# Patient Record
Sex: MALE | Race: WHITE | Employment: FULL TIME | URBAN - METROPOLITAN AREA
[De-identification: names, ages, dates, MRNs, and addresses within clinical notes are randomized per-mention and may not be internally consistent; named-entity substitution may affect disease eponyms.]

---

## 2017-11-14 ENCOUNTER — TELEPHONE (OUTPATIENT)
Dept: GASTROENTEROLOGY | Facility: CLINIC | Age: 37
End: 2017-11-14

## 2017-11-14 DIAGNOSIS — Z86.010 PERSONAL HISTORY OF COLONIC POLYPS: Primary | ICD-10-CM

## 2017-11-15 NOTE — TELEPHONE ENCOUNTER
Last Procedure:  Colonoscopy 01/15/15  Last Diagnosis:  3 colon polys, subtle sessile polyp, in the ascending colon, diverticulosis, internal hemorrhoids  Recalled for (years): 3-5 years  Sedation used previously:  MAC  Last Prep Used (if known):    [employer]

## 2017-11-27 NOTE — TELEPHONE ENCOUNTER
Dr Tosha Stack recall was for 3-5 years    No medications, allergies to Sulfa antibiotics    Please advise orders

## 2017-11-28 NOTE — TELEPHONE ENCOUNTER
Schedule colonoscopy exam at [facility] ([address]    This patient IS appropriate for the [facility] endoscopy center.     MAC anesthesia    Suprep small volume bowel prep if covered by insurance, otherwise Golytely (PEG) 4L hannah

## 2017-11-29 ENCOUNTER — TELEPHONE (OUTPATIENT)
Dept: GASTROENTEROLOGY | Facility: CLINIC | Age: 37
End: 2017-11-29

## 2017-11-29 NOTE — TELEPHONE ENCOUNTER
GI RN's - please place order for pt's Suprep. They have switched to a new pharmacy, [facility] in [place], which I have added in. Thank you.

## 2017-11-29 NOTE — TELEPHONE ENCOUNTER
Scheduled for:  Colonoscopy - 11340  Provider Name:  Dr. Vickie Kelly  Date:  3/6/18  Location:  [facility]  Sedation:  MAC  Time:  9:30 am (pt is aware to arrive at 8:30 am)  Prep:  Suprep, Prep instructions were given to pt's spouse over the phone, she verbalized und

## 2017-12-05 ENCOUNTER — TELEPHONE (OUTPATIENT)
Dept: GASTROENTEROLOGY | Facility: CLINIC | Age: 37
End: 2017-12-05

## 2017-12-05 NOTE — TELEPHONE ENCOUNTER
Spoke with patients wife and explained that due to insurance reasons ([employer]) patient cannot have procedure on Thursday as CB is at [facility]. She verbalized understanding and will keep procedure date from 3-6-18.

## 2018-01-20 ENCOUNTER — OFFICE VISIT (OUTPATIENT)
Dept: INTERNAL MEDICINE CLINIC | Facility: CLINIC | Age: 38
End: 2018-01-20

## 2018-01-20 ENCOUNTER — LAB ENCOUNTER (OUTPATIENT)
Dept: LAB | Age: 38
End: 2018-01-20
Attending: INTERNAL MEDICINE
Payer: COMMERCIAL

## 2018-01-20 ENCOUNTER — TELEPHONE (OUTPATIENT)
Dept: INTERNAL MEDICINE CLINIC | Facility: CLINIC | Age: 38
End: 2018-01-20

## 2018-01-20 VITALS
SYSTOLIC BLOOD PRESSURE: 120 MMHG | WEIGHT: 167 LBS | HEIGHT: 71 IN | BODY MASS INDEX: 23.38 KG/M2 | TEMPERATURE: 98 F | DIASTOLIC BLOOD PRESSURE: 74 MMHG | HEART RATE: 74 BPM

## 2018-01-20 DIAGNOSIS — B35.6 JOCK ITCH: ICD-10-CM

## 2018-01-20 DIAGNOSIS — E78.00 HYPERCHOLESTEROLEMIA: ICD-10-CM

## 2018-01-20 DIAGNOSIS — Z72.0 TOBACCO ABUSE: ICD-10-CM

## 2018-01-20 DIAGNOSIS — K64.4 EXTERNAL HEMORRHOIDS: ICD-10-CM

## 2018-01-20 DIAGNOSIS — Z00.00 ANNUAL PHYSICAL EXAM: ICD-10-CM

## 2018-01-20 DIAGNOSIS — Z00.00 ANNUAL PHYSICAL EXAM: Primary | ICD-10-CM

## 2018-01-20 DIAGNOSIS — L85.3 DRY SKIN DERMATITIS: ICD-10-CM

## 2018-01-20 DIAGNOSIS — Z82.49 FAMILY HISTORY OF CARDIAC ARREST: ICD-10-CM

## 2018-01-20 LAB
ALBUMIN SERPL BCP-MCNC: 4.1 G/DL (ref 3.5–4.8)
ALBUMIN/GLOB SERPL: 1.5 {RATIO} (ref 1–2)
ALP SERPL-CCNC: 76 U/L (ref 32–100)
ALT SERPL-CCNC: 19 U/L (ref 17–63)
ANION GAP SERPL CALC-SCNC: 7 MMOL/L (ref 0–18)
AST SERPL-CCNC: 19 U/L (ref 15–41)
BASOPHILS # BLD: 0.1 K/UL (ref 0–0.2)
BASOPHILS NFR BLD: 1 %
BILIRUB SERPL-MCNC: 0.8 MG/DL (ref 0.3–1.2)
BUN SERPL-MCNC: 12 MG/DL (ref 8–20)
BUN/CREAT SERPL: 14 (ref 10–20)
CALCIUM SERPL-MCNC: 9.1 MG/DL (ref 8.5–10.5)
CHLORIDE SERPL-SCNC: 104 MMOL/L (ref 95–110)
CHOLEST SERPL-MCNC: 253 MG/DL (ref 110–200)
CO2 SERPL-SCNC: 27 MMOL/L (ref 22–32)
CREAT SERPL-MCNC: 0.86 MG/DL (ref 0.5–1.5)
EOSINOPHIL # BLD: 0.4 K/UL (ref 0–0.7)
EOSINOPHIL NFR BLD: 6 %
ERYTHROCYTE [DISTWIDTH] IN BLOOD BY AUTOMATED COUNT: 12.9 % (ref 11–15)
GLOBULIN PLAS-MCNC: 2.8 G/DL (ref 2.5–3.7)
GLUCOSE SERPL-MCNC: 83 MG/DL (ref 70–99)
HCT VFR BLD AUTO: 43.8 % (ref 41–52)
HDLC SERPL-MCNC: 69 MG/DL
HGB BLD-MCNC: 14.5 G/DL (ref 13.5–17.5)
LDLC SERPL CALC-MCNC: 170 MG/DL (ref 0–99)
LYMPHOCYTES # BLD: 2.5 K/UL (ref 1–4)
LYMPHOCYTES NFR BLD: 36 %
MCH RBC QN AUTO: 30 PG (ref 27–32)
MCHC RBC AUTO-ENTMCNC: 33.2 G/DL (ref 32–37)
MCV RBC AUTO: 90.5 FL (ref 80–100)
MONOCYTES # BLD: 0.6 K/UL (ref 0–1)
MONOCYTES NFR BLD: 9 %
NEUTROPHILS # BLD AUTO: 3.3 K/UL (ref 1.8–7.7)
NEUTROPHILS NFR BLD: 47 %
NONHDLC SERPL-MCNC: 184 MG/DL
OSMOLALITY UR CALC.SUM OF ELEC: 285 MOSM/KG (ref 275–295)
PLATELET # BLD AUTO: 268 K/UL (ref 140–400)
PMV BLD AUTO: 8.3 FL (ref 7.4–10.3)
POTASSIUM SERPL-SCNC: 4.1 MMOL/L (ref 3.3–5.1)
PROT SERPL-MCNC: 6.9 G/DL (ref 5.9–8.4)
RBC # BLD AUTO: 4.84 M/UL (ref 4.5–5.9)
SODIUM SERPL-SCNC: 138 MMOL/L (ref 136–144)
TRIGL SERPL-MCNC: 68 MG/DL (ref 1–149)
TSH SERPL-ACNC: 1.52 UIU/ML (ref 0.45–5.33)
WBC # BLD AUTO: 6.9 K/UL (ref 4–11)

## 2018-01-20 PROCEDURE — 84443 ASSAY THYROID STIM HORMONE: CPT

## 2018-01-20 PROCEDURE — 85025 COMPLETE CBC W/AUTO DIFF WBC: CPT

## 2018-01-20 PROCEDURE — 80053 COMPREHEN METABOLIC PANEL: CPT

## 2018-01-20 PROCEDURE — 99406 BEHAV CHNG SMOKING 3-10 MIN: CPT | Performed by: INTERNAL MEDICINE

## 2018-01-20 PROCEDURE — 99395 PREV VISIT EST AGE 18-39: CPT | Performed by: INTERNAL MEDICINE

## 2018-01-20 PROCEDURE — 80061 LIPID PANEL: CPT

## 2018-01-20 PROCEDURE — 99212 OFFICE O/P EST SF 10 MIN: CPT | Performed by: INTERNAL MEDICINE

## 2018-01-20 PROCEDURE — 36415 COLL VENOUS BLD VENIPUNCTURE: CPT

## 2018-01-20 PROCEDURE — 99214 OFFICE O/P EST MOD 30 MIN: CPT | Performed by: INTERNAL MEDICINE

## 2018-01-20 NOTE — PATIENT INSTRUCTIONS
1.  Start taking Aspirin 81mg daily. 2.  Stop smoking. Prevention Guidelines, Men Ages 25 to 44  Screening tests and vaccines are an important part of managing your health. Health counseling is essential, too.  Below are guidelines for these, for men Hepatitis B Men at increased risk for infection – talk with your healthcare provider 3 doses over 6 months; second dose should be given 1 month after the first dose; the third dose should be given at least 2 months after the second dose and at least 4 katerina © 8689-4197 [address]. 1407 [facility] [place], [facility] [place]. All rights reserved. This information is not intended as a substitute for professional medical care. Always follow your healthcare professional's instructions.         Herbie Broussard Do not take this medicine with any of the following medications:  · red yeast rice  · telaprevir  · telithromycin  · voriconazole  This medicine may also interact with the following medications:  · alcohol  · antiviral medicines for HIV or AIDS  · boceprev Tell your doctor or health care professional right away if you get any unexplained muscle pain, tenderness, or weakness, especially if you also have a fever and tiredness.  Your doctor or health care professional may tell you to stop taking this medicine if · Work to create a tasty, healthy eating plan that you can stick to for the rest of your life. Goals for healthy eating  Below are some tips to improve your eating habits:  · Limit saturated fats and trans fats.  Saturated fats raise your levels of [place] · Low-fat or nonfat dairy provides nutrients without a lot of fat. Try low-fat or nonfat milk, cheese, or yogurt. · Healthy fats can be good for you in small amounts. These are unsaturated fats, such as olive oil, nuts, and fish.  Try to have at least 2 se Smoking is one of the hardest habits to break. About half of all people who have ever smoked have been able to quit. Most people who still smoke want to quit. Here are some of the best ways to stop smoking.     Keep trying  Most smokers make many attempts a After reviewing your smoking patterns and past attempts to quit, your doctor may offer a prescription medicine such as bupropion, varenicline, a nicotine inhaler, or nasal spray. Each has advantages and side effects. Your doctor can review these with you.

## 2018-01-20 NOTE — PROGRESS NOTES
Patient ID: Arthur Macedo is a 44year old male. Patient presents with:  Physical: Physical and labs       HISTORY OF PRESENT ILLNESS:   HPI  Patient presents for above.   Here for annual physical exam.  Has known history of high cholesterol from labs Musculoskeletal: Negative. Skin: Positive for rash. Allergic/Immunologic: Negative. Neurological: Negative. Hematological: Negative. Psychiatric/Behavioral: The patient is nervous/anxious.       MEDICAL HISTORY:   No past medical history on fi Abdominal: Soft. Bowel sounds are normal. He exhibits no distension. Hernia confirmed negative in the right inguinal area and confirmed negative in the left inguinal area. Genitourinary: Penis normal. Circumcised.    Musculoskeletal: Normal range of motio Return in about 3 months (around 4/20/2018).     45 minutes spent with patient  30 minutes chart review, counseling, documentation, and coordination of care    Kimber Broussard MD  1/20/2018

## 2018-01-22 ENCOUNTER — TELEPHONE (OUTPATIENT)
Dept: INTERNAL MEDICINE CLINIC | Facility: CLINIC | Age: 38
End: 2018-01-22

## 2018-01-22 ENCOUNTER — TELEPHONE (OUTPATIENT)
Dept: FAMILY MEDICINE CLINIC | Facility: CLINIC | Age: 38
End: 2018-01-22

## 2018-01-23 DIAGNOSIS — E78.00 HYPERCHOLESTEROLEMIA: Primary | ICD-10-CM

## 2018-01-23 RX ORDER — ATORVASTATIN CALCIUM 40 MG/1
40 TABLET, FILM COATED ORAL NIGHTLY
Qty: 90 TABLET | Refills: 0 | Status: SHIPPED | OUTPATIENT
Start: 2018-01-23 | End: 2018-04-17

## 2018-01-23 NOTE — TELEPHONE ENCOUNTER
Pt's wife is calling to f/u on results. Pt's wife HIPPA is actually  (). Pt's wife is requesting them to be uploaded on [address] [place] [address] [address].

## 2018-01-24 NOTE — TELEPHONE ENCOUNTER
Viewed by Luisa Breen on 1/23/2018  9:26 PM   Written by Narmata Chairez MD on 1/23/2018  8:27 PM   Dear Aaliyah Marr,     Your labs are attached. Pat Alvarez have tried contacting you twice regarding your labs.  As you can see your cholesterol panel is significantly

## 2018-01-24 NOTE — TELEPHONE ENCOUNTER
Patient called to confirm if medication was sent to the pharmacy. Advised patient on Dr. Birgit Paul information and prescription, name, dose, frequency. Patient verbalized understanding.  He asked if Lipitor could be taken with baby aspirin; advised that Lipit

## 2018-01-24 NOTE — TELEPHONE ENCOUNTER
Pt's wife called in asking if Dr. Alexey Da Silva can give him another call to further discuss his lab results. Pt's wife states the best # for the pt is [phone].

## 2018-02-24 ENCOUNTER — OFFICE VISIT (OUTPATIENT)
Dept: DERMATOLOGY CLINIC | Facility: CLINIC | Age: 38
End: 2018-02-24

## 2018-02-24 DIAGNOSIS — D23.5 BENIGN NEOPLASM OF SKIN OF TRUNK, EXCEPT SCROTUM: ICD-10-CM

## 2018-02-24 DIAGNOSIS — D23.4 BENIGN NEOPLASM OF SCALP AND SKIN OF NECK: ICD-10-CM

## 2018-02-24 DIAGNOSIS — L81.2 EPHELIDES: ICD-10-CM

## 2018-02-24 DIAGNOSIS — D23.60 BENIGN NEOPLASM OF SKIN OF UPPER LIMB, INCLUDING SHOULDER, UNSPECIFIED LATERALITY: ICD-10-CM

## 2018-02-24 DIAGNOSIS — L82.1 SEBORRHEIC KERATOSES: ICD-10-CM

## 2018-02-24 DIAGNOSIS — D23.70 BENIGN NEOPLASM OF SKIN OF LOWER LIMB, INCLUDING HIP, UNSPECIFIED LATERALITY: ICD-10-CM

## 2018-02-24 DIAGNOSIS — L30.9 DERMATITIS: Primary | ICD-10-CM

## 2018-02-24 DIAGNOSIS — D23.30 BENIGN NEOPLASM OF SKIN OF FACE: ICD-10-CM

## 2018-02-24 PROCEDURE — 99203 OFFICE O/P NEW LOW 30 MIN: CPT | Performed by: DERMATOLOGY

## 2018-02-24 PROCEDURE — 99212 OFFICE O/P EST SF 10 MIN: CPT | Performed by: DERMATOLOGY

## 2018-02-24 RX ORDER — TRIAMCINOLONE ACETONIDE 5 MG/G
OINTMENT TOPICAL
Qty: 60 G | Refills: 3 | Status: SHIPPED | OUTPATIENT
Start: 2018-02-24 | End: 2019-04-20

## 2018-03-02 ENCOUNTER — TELEPHONE (OUTPATIENT)
Dept: GASTROENTEROLOGY | Facility: CLINIC | Age: 38
End: 2018-03-02

## 2018-03-02 NOTE — TELEPHONE ENCOUNTER
Spoke to pts wife Gurmeet Fay (Rose oliver), States pt takes meds at night. Informed ok to take medications at usual time after procedure. Verbalized understanding and had no further questions at this time.

## 2018-03-02 NOTE — TELEPHONE ENCOUNTER
Pt wife called with questions about medication. States pt is currently taking     Baby Aspirin   ATORVASTATIN   Would like to know if its ok to continue taking before procedure on 03/06/2018.  Please call thank you

## 2018-03-05 NOTE — PROGRESS NOTES
Tate Dozier is a 44year old male.   HPI:     CC:  Patient presents with:  Dryness: Dry skin in ears x 3 years  Rash Skin Problem (integumentary): Patient complains of jock itch that never clears up and is intermittent ongoing for 3 years  Moles: [facility] Topics   Smoking status: Current Every Day Smoker  1.00 Packs/day  For 10.00 Years     Types: Cigarettes, Cigars    Smokeless tobacco: Current User    Comment: <1 per day and Vape    Alcohol use Yes    Comment: 2 beers occasionally.   Last drink - last nigh axillae,  abdomen, arms, legs, palms. Multiple light to medium brown, well marginated, uniformly pigmented, macules and papules 6 mm and less scattered on exam. pigmented lesions examined with dermoscopy benign-appearing patterns.      Leonela Palmer they are doing over the next several weeks. Await clinical response to above therapies. 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 Triamcinolone. Call if not improving little change previously with topical antifungals.   May continue

## 2018-03-06 ENCOUNTER — ANESTHESIA EVENT (OUTPATIENT)
Dept: ENDOSCOPY | Facility: HOSPITAL | Age: 38
End: 2018-03-06
Payer: COMMERCIAL

## 2018-03-06 ENCOUNTER — HOSPITAL ENCOUNTER (OUTPATIENT)
Facility: HOSPITAL | Age: 38
Setting detail: HOSPITAL OUTPATIENT SURGERY
Discharge: HOME | End: 2018-03-06
Attending: INTERNAL MEDICINE | Admitting: INTERNAL MEDICINE
Payer: COMMERCIAL

## 2018-03-06 ENCOUNTER — ANESTHESIA (OUTPATIENT)
Dept: ENDOSCOPY | Facility: HOSPITAL | Age: 38
End: 2018-03-06
Payer: COMMERCIAL

## 2018-03-06 ENCOUNTER — SURGERY (OUTPATIENT)
Age: 38
End: 2018-03-06

## 2018-03-06 DIAGNOSIS — Z86.010 PERSONAL HISTORY OF COLONIC POLYPS: Primary | ICD-10-CM

## 2018-03-06 PROCEDURE — 0DJD8ZZ INSPECTION OF LOWER INTESTINAL TRACT, VIA NATURAL OR ARTIFICIAL OPENING ENDOSCOPIC: ICD-10-PCS | Performed by: INTERNAL MEDICINE

## 2018-03-06 PROCEDURE — 45378 DIAGNOSTIC COLONOSCOPY: CPT | Performed by: INTERNAL MEDICINE

## 2018-03-06 RX ORDER — NALOXONE HYDROCHLORIDE 0.4 MG/ML
80 INJECTION, SOLUTION INTRAMUSCULAR; INTRAVENOUS; SUBCUTANEOUS AS NEEDED
Status: DISCONTINUED | OUTPATIENT
Start: 2018-03-06 | End: 2018-03-06

## 2018-03-06 RX ORDER — LIDOCAINE HYDROCHLORIDE 10 MG/ML
INJECTION, SOLUTION EPIDURAL; INFILTRATION; INTRACAUDAL; PERINEURAL AS NEEDED
Status: DISCONTINUED | OUTPATIENT
Start: 2018-03-06 | End: 2018-03-06 | Stop reason: SURG

## 2018-03-06 RX ORDER — SODIUM CHLORIDE, SODIUM LACTATE, POTASSIUM CHLORIDE, CALCIUM CHLORIDE 600; 310; 30; 20 MG/100ML; MG/100ML; MG/100ML; MG/100ML
INJECTION, SOLUTION INTRAVENOUS CONTINUOUS
Status: DISCONTINUED | OUTPATIENT
Start: 2018-03-06 | End: 2018-03-06

## 2018-03-06 RX ADMIN — SODIUM CHLORIDE, SODIUM LACTATE, POTASSIUM CHLORIDE, CALCIUM CHLORIDE: 600; 310; 30; 20 INJECTION, SOLUTION INTRAVENOUS at 10:52:00

## 2018-03-06 RX ADMIN — SODIUM CHLORIDE, SODIUM LACTATE, POTASSIUM CHLORIDE, CALCIUM CHLORIDE: 600; 310; 30; 20 INJECTION, SOLUTION INTRAVENOUS at 10:16:00

## 2018-03-06 RX ADMIN — LIDOCAINE HYDROCHLORIDE 50 MG: 10 INJECTION, SOLUTION EPIDURAL; INFILTRATION; INTRACAUDAL; PERINEURAL at 10:16:00

## 2018-03-06 NOTE — ANESTHESIA POSTPROCEDURE EVALUATION
Patient: Luz Carlos    Procedure Summary     Date:  03/06/18 Room / Location:  [facility] ENDOSCOPY 05 / [facility] ENDOSCOPY    Anesthesia Start:  7418 Anesthesia Stop:      Procedure:  COLONOSCOPY (N/A ) Diagnosis:       Personal history of colonic polyps

## 2018-03-06 NOTE — ANESTHESIA PREPROCEDURE EVALUATION
Anesthesia PreOp Note    HPI:     Tyra Murdock is a 44year old male who presents for preoperative consultation requested by: Glenn Cruz MD    Date of Surgery: 3/6/2018    Procedure(s):  COLONOSCOPY  Indication: Personal history of colo Types: Cigarettes, Cigars    Smokeless tobacco: Current User    Comment: <1 per day and Vape    Alcohol use Yes    Comment: 2 beers occasionally.   Last drink - last night    Drug use: No    Sexual activity: Not on file     Other Topics Concern    Danna nature of the anesthetic plan, benefits, risks, major complications, and any alternative forms of anesthetic management. All of the patient's questions were answered to the best of my ability. The patient desires the anesthetic management as planned.   LA

## 2018-03-08 VITALS
BODY MASS INDEX: 22.9 KG/M2 | DIASTOLIC BLOOD PRESSURE: 84 MMHG | RESPIRATION RATE: 18 BRPM | HEIGHT: 70 IN | OXYGEN SATURATION: 98 % | WEIGHT: 160 LBS | HEART RATE: 84 BPM | SYSTOLIC BLOOD PRESSURE: 114 MMHG

## 2018-04-07 ENCOUNTER — OFFICE VISIT (OUTPATIENT)
Dept: INTERNAL MEDICINE CLINIC | Facility: CLINIC | Age: 38
End: 2018-04-07

## 2018-04-07 ENCOUNTER — APPOINTMENT (OUTPATIENT)
Dept: LAB | Age: 38
End: 2018-04-07
Attending: INTERNAL MEDICINE
Payer: COMMERCIAL

## 2018-04-07 VITALS
HEIGHT: 71 IN | HEART RATE: 84 BPM | SYSTOLIC BLOOD PRESSURE: 114 MMHG | WEIGHT: 166 LBS | BODY MASS INDEX: 23.24 KG/M2 | TEMPERATURE: 98 F | DIASTOLIC BLOOD PRESSURE: 65 MMHG

## 2018-04-07 DIAGNOSIS — E78.00 HYPERCHOLESTEROLEMIA: ICD-10-CM

## 2018-04-07 DIAGNOSIS — Z82.49 FAMILY HISTORY OF CARDIAC ARREST: ICD-10-CM

## 2018-04-07 DIAGNOSIS — E78.00 HYPERCHOLESTEROLEMIA: Primary | ICD-10-CM

## 2018-04-07 DIAGNOSIS — F17.210 CIGARETTE NICOTINE DEPENDENCE WITHOUT COMPLICATION: ICD-10-CM

## 2018-04-07 PROCEDURE — 80061 LIPID PANEL: CPT

## 2018-04-07 PROCEDURE — 80053 COMPREHEN METABOLIC PANEL: CPT

## 2018-04-07 PROCEDURE — 99214 OFFICE O/P EST MOD 30 MIN: CPT | Performed by: INTERNAL MEDICINE

## 2018-04-07 PROCEDURE — 99212 OFFICE O/P EST SF 10 MIN: CPT | Performed by: INTERNAL MEDICINE

## 2018-04-07 PROCEDURE — 99406 BEHAV CHNG SMOKING 3-10 MIN: CPT | Performed by: INTERNAL MEDICINE

## 2018-04-07 PROCEDURE — 36415 COLL VENOUS BLD VENIPUNCTURE: CPT

## 2018-04-07 RX ORDER — ATORVASTATIN CALCIUM 40 MG/1
40 TABLET, FILM COATED ORAL NIGHTLY
Qty: 90 TABLET | Refills: 0 | Status: CANCELLED | OUTPATIENT
Start: 2018-04-07

## 2018-04-07 NOTE — PROGRESS NOTES
Patient ID: Carlos Centeno is a 44year old male. Patient presents with: Follow - Up: Cholesterol       HISTORY OF PRESENT ILLNESS:   HPI  Patient presents for above. Here for follow-up of elevated cholesterol and other issues.   Patient here with hi 3  •  Bacitracin-Polymyx-Lalito-HC 1 % Ophthalmic Ointment, Use bid as dir to psoriasis around eyes, Disp: 15 g, Rfl: 2    Allergies:  Sulfa Antibiotics           Comment:Other reaction(s): rashes      Social History  Social History   Marital status:  father is on. · Diet and exercise discussed. · Continue to keep food diary. 2. Family history of cardiac arrest  · Reiterated importance of needing to take care of his medical issues given his early cardiac disease in the family.   · Needs to stop smok

## 2018-04-07 NOTE — PATIENT INSTRUCTIONS
1.  Follow-up in 3 months. 2.  Stop smoking E cigarettes and tobacco cigarettes. Low-Cholesterol Diet  Your body needs cholesterol to build new cells and create certain hormones.  There are 2 kinds of cholesterol in your blood:     · HDL (“good”) chol High blood cholesterol is usually due to a diet high in saturated fat, along with not being physically active. In some cases, genetics plays a role in causing high cholesterol.  The tips below will help you create healthy eating habits that will help lower © 6374-8183 [address]. 1407 [facility] [place], [address] [place]. All rights reserved. This information is not intended as a substitute for professional medical care. Always follow your healthcare professional's instructions.   How to Quit S Nicotine replacement therapy may make quitting easier. Certain aids, such as the nicotine patch, gum, and lozenges, are available without a prescription. It is best to use these under a doctor’s care, though.  The skin patch provides a steady supply of jamaal © 1718-1395 [address]. 1407 [facility] [place], [facility]2 [place]. All rights reserved. This information is not intended as a substitute for professional medical care. Always follow your healthcare professional's instructions.

## 2018-04-09 ENCOUNTER — PATIENT MESSAGE (OUTPATIENT)
Dept: INTERNAL MEDICINE CLINIC | Facility: CLINIC | Age: 38
End: 2018-04-09

## 2018-04-10 NOTE — TELEPHONE ENCOUNTER
From: Shantanu Hart  To: Tim Mercado MD  Sent: 4/9/2018 6:51 PM CDT  Subject: Visit Follow-up Question    It is great to see such a significant decrease in all the levels, cholestorol, ldl, etc., but how come they decreased so much so quickly?  I thoug

## 2018-04-16 ENCOUNTER — TELEPHONE (OUTPATIENT)
Dept: INTERNAL MEDICINE CLINIC | Facility: CLINIC | Age: 38
End: 2018-04-16

## 2018-04-16 DIAGNOSIS — E78.00 HYPERCHOLESTEROLEMIA: ICD-10-CM

## 2018-04-16 NOTE — TELEPHONE ENCOUNTER
Patient requesting refill for     atorvastatin 40 MG Oral Tab Take 1 tablet (40 mg total) by mouth nightly. Disp: 90 tablet Rfl: 0     Patient is almost out of medication.      Please advise

## 2018-04-17 RX ORDER — ATORVASTATIN CALCIUM 40 MG/1
40 TABLET, FILM COATED ORAL NIGHTLY
Qty: 90 TABLET | Refills: 0 | Status: SHIPPED | OUTPATIENT
Start: 2018-04-17 | End: 2018-07-17

## 2018-04-17 NOTE — TELEPHONE ENCOUNTER
Requesting Atorvastatin refill, notified pt's spouse.     Prescription refilled per IM/FM refill protocol    Cholesterol Medications  Protocol Criteria:  · Appointment scheduled in the past 12 months or in the next 3 months  · ALT & LDL on file in the past

## 2018-07-17 DIAGNOSIS — E78.00 HYPERCHOLESTEROLEMIA: ICD-10-CM

## 2018-07-17 RX ORDER — ATORVASTATIN CALCIUM 40 MG/1
TABLET, FILM COATED ORAL
Qty: 90 TABLET | Refills: 0 | Status: SHIPPED | OUTPATIENT
Start: 2018-07-17 | End: 2018-10-14

## 2018-07-25 ENCOUNTER — OFFICE VISIT (OUTPATIENT)
Dept: INTERNAL MEDICINE CLINIC | Facility: CLINIC | Age: 38
End: 2018-07-25
Payer: COMMERCIAL

## 2018-07-25 VITALS
DIASTOLIC BLOOD PRESSURE: 77 MMHG | HEART RATE: 91 BPM | BODY MASS INDEX: 22.96 KG/M2 | HEIGHT: 71 IN | WEIGHT: 164 LBS | SYSTOLIC BLOOD PRESSURE: 128 MMHG

## 2018-07-25 DIAGNOSIS — F17.210 CIGARETTE NICOTINE DEPENDENCE WITHOUT COMPLICATION: ICD-10-CM

## 2018-07-25 DIAGNOSIS — E78.00 HYPERCHOLESTEROLEMIA: Primary | ICD-10-CM

## 2018-07-25 PROCEDURE — 99213 OFFICE O/P EST LOW 20 MIN: CPT | Performed by: INTERNAL MEDICINE

## 2018-07-25 PROCEDURE — 99212 OFFICE O/P EST SF 10 MIN: CPT | Performed by: INTERNAL MEDICINE

## 2018-07-25 NOTE — PATIENT INSTRUCTIONS
How to Quit Smoking  Smoking is one of the hardest habits to break. About half of all people who have ever smoked have been able to quit. Most people who still smoke want to quit. Here are some of the best ways to stop smoking.     Keep trying  Most smoke After reviewing your smoking patterns and past attempts to quit, your doctor may offer a prescription medicine such as bupropion, varenicline, a nicotine inhaler, or nasal spray. Each has advantages and side effects. Your doctor can review these with you. · LDL (“bad”) cholesterol. This stays in your body and sticks to artery walls. Over time it may block blood flow to the heart and brain. This can cause a heart attack or stroke.   The cholesterol in your blood comes from 2 sources: cholesterol in food that · Learn to read nutrition labels and select appropriate portion sizes. · When cooking, use plant-based unsaturated vegetable oils (sunflower, corn, soybean, canola, peanut, and olive oils).   · Avoid saturated fats found in animal products such as meat, da

## 2018-07-25 NOTE — PROGRESS NOTES
Patient ID: Kelly Copeland is a 43year old male. Patient presents with:  Hyperlipidemia: follow up       HISTORY OF PRESENT ILLNESS:   HPI  Patient presents for above. Here for follow-up multiple issues. History of high cholesterol.   Taking low-dos Packs/day  For 10.00 Years     Types: Cigarettes, Cigars    Smokeless tobacco: Current User    Comment: <1 per day and Vape    Alcohol use Yes    Comment: 2 beers occasionally.   Last drink - last night    Drug use: No    Sexual activity: Not on file     Ot

## 2018-10-14 DIAGNOSIS — E78.00 HYPERCHOLESTEROLEMIA: ICD-10-CM

## 2018-10-15 RX ORDER — ATORVASTATIN CALCIUM 40 MG/1
TABLET, FILM COATED ORAL
Qty: 90 TABLET | Refills: 0 | Status: SHIPPED | OUTPATIENT
Start: 2018-10-15 | End: 2019-01-13

## 2018-10-15 NOTE — TELEPHONE ENCOUNTER
Cholesterol Medications  Protocol Criteria:  · Appointment scheduled in the past 12 months or in the next 3 months  · ALT & LDL on file in the past 12 months  · ALT result < 80  · LDL result <130   Recent Outpatient Visits            2 months ago Hyperchol

## 2018-10-20 ENCOUNTER — IMMUNIZATION (OUTPATIENT)
Dept: PEDIATRICS CLINIC | Facility: CLINIC | Age: 38
End: 2018-10-20
Payer: COMMERCIAL

## 2018-10-20 DIAGNOSIS — Z23 NEED FOR VACCINATION: ICD-10-CM

## 2018-10-20 PROCEDURE — 90471 IMMUNIZATION ADMIN: CPT | Performed by: PEDIATRICS

## 2018-10-20 PROCEDURE — 90686 IIV4 VACC NO PRSV 0.5 ML IM: CPT | Performed by: PEDIATRICS

## 2018-11-13 ENCOUNTER — OFFICE VISIT (OUTPATIENT)
Dept: INTERNAL MEDICINE CLINIC | Facility: CLINIC | Age: 38
End: 2018-11-13
Payer: COMMERCIAL

## 2018-11-13 VITALS
BODY MASS INDEX: 23.24 KG/M2 | WEIGHT: 166 LBS | HEIGHT: 71 IN | TEMPERATURE: 98 F | DIASTOLIC BLOOD PRESSURE: 70 MMHG | HEART RATE: 102 BPM | SYSTOLIC BLOOD PRESSURE: 115 MMHG

## 2018-11-13 DIAGNOSIS — J01.10 ACUTE NON-RECURRENT FRONTAL SINUSITIS: Primary | ICD-10-CM

## 2018-11-13 PROCEDURE — 99213 OFFICE O/P EST LOW 20 MIN: CPT | Performed by: INTERNAL MEDICINE

## 2018-11-13 PROCEDURE — 99212 OFFICE O/P EST SF 10 MIN: CPT | Performed by: INTERNAL MEDICINE

## 2018-11-13 RX ORDER — AMOXICILLIN AND CLAVULANATE POTASSIUM 875; 125 MG/1; MG/1
1 TABLET, FILM COATED ORAL 2 TIMES DAILY
Qty: 20 TABLET | Refills: 0 | Status: SHIPPED | OUTPATIENT
Start: 2018-11-13 | End: 2018-11-23

## 2018-11-14 NOTE — PROGRESS NOTES
Patient ID: Bradly Cosby is a 43year old male. Patient presents with:  Sinus Problem: Possible Sinus infection, nasal congestion, head pressure. HISTORY OF PRESENT ILLNESS:   HPI  Patient presents for above.   Here with sinus congestion for t Allergies:  Sulfa Antibiotics           Comment:Other reaction(s): rashes    Social History    Socioeconomic History      Marital status:       Spouse name: Not on file      Number of children: Not on file      Years of education: Not on file Exam   Constitutional: He appears well-developed and well-nourished. HENT:   Head: Normocephalic and atraumatic. Nose: Mucosal edema, rhinorrhea and sinus tenderness present. Right sinus exhibits frontal sinus tenderness.  Right sinus exhibits no maxill

## 2019-01-13 DIAGNOSIS — E78.00 HYPERCHOLESTEROLEMIA: ICD-10-CM

## 2019-01-13 RX ORDER — ATORVASTATIN CALCIUM 40 MG/1
TABLET, FILM COATED ORAL
Qty: 90 TABLET | Refills: 0 | Status: SHIPPED | OUTPATIENT
Start: 2019-01-13 | End: 2019-04-08

## 2019-04-08 DIAGNOSIS — E78.00 HYPERCHOLESTEROLEMIA: ICD-10-CM

## 2019-04-09 RX ORDER — ATORVASTATIN CALCIUM 40 MG/1
TABLET, FILM COATED ORAL
Qty: 90 TABLET | Refills: 0 | Status: SHIPPED | OUTPATIENT
Start: 2019-04-09 | End: 2019-07-10

## 2019-04-09 NOTE — TELEPHONE ENCOUNTER
Please review; protocol failed.     Requested Prescriptions     Pending Prescriptions Disp Refills   • ATORVASTATIN 40 MG Oral Tab [Pharmacy Med Name: ATORVASTATIN 40 MG TABLET] 90 tablet 0     Sig: TAKE 1 TABLET BY MOUTH EVERY DAY AT NIGHT         Recent V

## 2019-04-09 NOTE — TELEPHONE ENCOUNTER
CSS please contact pt to schedule f/u appt and notify of blood work needed as well, thank you.  Please reply to pool: TAHIRA Delgado

## 2019-04-20 ENCOUNTER — OFFICE VISIT (OUTPATIENT)
Dept: INTERNAL MEDICINE CLINIC | Facility: CLINIC | Age: 39
End: 2019-04-20
Payer: COMMERCIAL

## 2019-04-20 ENCOUNTER — LAB ENCOUNTER (OUTPATIENT)
Dept: LAB | Age: 39
End: 2019-04-20
Attending: INTERNAL MEDICINE
Payer: COMMERCIAL

## 2019-04-20 VITALS
SYSTOLIC BLOOD PRESSURE: 119 MMHG | DIASTOLIC BLOOD PRESSURE: 79 MMHG | BODY MASS INDEX: 24.36 KG/M2 | HEART RATE: 80 BPM | HEIGHT: 71 IN | RESPIRATION RATE: 17 BRPM | WEIGHT: 174 LBS

## 2019-04-20 DIAGNOSIS — Z00.00 ANNUAL PHYSICAL EXAM: Primary | ICD-10-CM

## 2019-04-20 DIAGNOSIS — Z30.09 ENCOUNTER FOR VASECTOMY COUNSELING: ICD-10-CM

## 2019-04-20 DIAGNOSIS — E78.00 HYPERCHOLESTEROLEMIA: ICD-10-CM

## 2019-04-20 DIAGNOSIS — Z00.00 ANNUAL PHYSICAL EXAM: ICD-10-CM

## 2019-04-20 DIAGNOSIS — F17.210 CIGARETTE NICOTINE DEPENDENCE WITHOUT COMPLICATION: ICD-10-CM

## 2019-04-20 DIAGNOSIS — Z82.49 FAMILY HISTORY OF CARDIAC ARREST: ICD-10-CM

## 2019-04-20 PROCEDURE — 80061 LIPID PANEL: CPT

## 2019-04-20 PROCEDURE — 80053 COMPREHEN METABOLIC PANEL: CPT

## 2019-04-20 PROCEDURE — 84443 ASSAY THYROID STIM HORMONE: CPT

## 2019-04-20 PROCEDURE — 85025 COMPLETE CBC W/AUTO DIFF WBC: CPT

## 2019-04-20 PROCEDURE — 99395 PREV VISIT EST AGE 18-39: CPT | Performed by: INTERNAL MEDICINE

## 2019-04-20 PROCEDURE — 36415 COLL VENOUS BLD VENIPUNCTURE: CPT

## 2019-04-20 RX ORDER — TRIAMCINOLONE ACETONIDE 5 MG/G
OINTMENT TOPICAL
Qty: 60 G | Refills: 3 | Status: SHIPPED | OUTPATIENT
Start: 2019-04-20 | End: 2020-11-10

## 2019-04-20 NOTE — PROGRESS NOTES
Patient ID: Cristal Guzman is a 43year old male. Patient presents with:  Physical       HISTORY OF PRESENT ILLNESS:   HPI  Patient presents for above. Here for annual physical and discuss multiple medical problems.     History of high cholesterol med History    Socioeconomic History      Marital status:       Spouse name: Not on file      Number of children: Not on file      Years of education: Not on file      Highest education level: Not on file    Occupational History      Not on file    Soci Asked        Bike Helmet: Not Asked        Seat Belt: Not Asked        Self-Exams: Not Asked        Grew up on a farm: No        History of tanning: No        Outdoor occupation: Yes        Pt has a pacemaker: No        Pt has a defibrillator: No        Re Aretha Mukherjee MD  4/20/2019

## 2019-04-20 NOTE — PATIENT INSTRUCTIONS
Controlling Your Cholesterol  Cholesterol is a waxy substance. It travels in your blood through the blood vessels. When you have high cholesterol, it can build up along the walls of the blood vessels.  This makes the vessels narrower and decreases blood f · Choose an activity you enjoy. Walking, swimming, and riding a bike are some good ways to be active. · Start at a level where you feel comfortable. Increase your time and pace a little each week.   · Work up to 30 to 40 minutes of moderate to high intensi Most former smokers quit cold turkey (all at once). Trying to cut back gradually doesn't seem to work as well, perhaps because it continues the smoking habit. Also, it is possible to inhale more while smoking fewer cigarettes.  This results in the same amou · 20 minutes: Blood pressure and pulse return to normal.  · 8 hours: Oxygen levels return to normal.  · 2 days: Ability to smell and taste begin to improve as damaged nerves regrow. · 2 to 3 weeks: Circulation and lung function improve.   · 1 to 9 months:

## 2019-05-02 ENCOUNTER — TELEPHONE (OUTPATIENT)
Dept: INTERNAL MEDICINE CLINIC | Facility: CLINIC | Age: 39
End: 2019-05-02

## 2019-05-02 DIAGNOSIS — Z11.59 MEASLES SCREENING: Primary | ICD-10-CM

## 2019-05-03 NOTE — TELEPHONE ENCOUNTER
Reviewed doctor's orders with pt's spouse, Carline Allen (BETTY in place). Carline Allen agreed with plan of care and had no further questions at this time.

## 2019-05-04 ENCOUNTER — APPOINTMENT (OUTPATIENT)
Dept: LAB | Age: 39
End: 2019-05-04
Attending: INTERNAL MEDICINE
Payer: COMMERCIAL

## 2019-05-04 DIAGNOSIS — Z11.59 MEASLES SCREENING: ICD-10-CM

## 2019-05-04 PROCEDURE — 86765 RUBEOLA ANTIBODY: CPT

## 2019-05-04 PROCEDURE — 36415 COLL VENOUS BLD VENIPUNCTURE: CPT

## 2019-07-10 DIAGNOSIS — E78.00 HYPERCHOLESTEROLEMIA: ICD-10-CM

## 2019-07-10 RX ORDER — ATORVASTATIN CALCIUM 40 MG/1
TABLET, FILM COATED ORAL
Qty: 90 TABLET | Refills: 1 | Status: SHIPPED | OUTPATIENT
Start: 2019-07-10 | End: 2020-01-26

## 2019-07-10 NOTE — TELEPHONE ENCOUNTER
Refill passed per [facility] protocol.   Cholesterol Medications  Protocol Criteria:  · Appointment scheduled in the past 12 months or in the next 3 months  · ALT & LDL on file in the past 12 months  · ALT result < 80  · LDL result <130   Recent Outpat

## 2020-01-25 DIAGNOSIS — E78.00 HYPERCHOLESTEROLEMIA: ICD-10-CM

## 2020-01-26 RX ORDER — ATORVASTATIN CALCIUM 40 MG/1
TABLET, FILM COATED ORAL
Qty: 90 TABLET | Refills: 1 | Status: SHIPPED | OUTPATIENT
Start: 2020-01-26 | End: 2020-07-29

## 2020-01-26 NOTE — TELEPHONE ENCOUNTER
Refill passed per [facility] protocol.   Cholesterol Medications  Protocol Criteria:  · Appointment scheduled in the past 12 months or in the next 3 months  · ALT & LDL on file in the past 12 months  · ALT result < 80  · LDL result <130   Recent Outpat

## 2020-02-04 ENCOUNTER — HOSPITAL ENCOUNTER (OUTPATIENT)
Dept: GENERAL RADIOLOGY | Facility: HOSPITAL | Age: 40
Discharge: HOME | End: 2020-02-04
Attending: ORTHOPAEDIC SURGERY
Payer: COMMERCIAL

## 2020-02-04 ENCOUNTER — OFFICE VISIT (OUTPATIENT)
Dept: ORTHOPEDICS CLINIC | Facility: CLINIC | Age: 40
End: 2020-02-04
Payer: COMMERCIAL

## 2020-02-04 VITALS — HEIGHT: 71 IN | WEIGHT: 174 LBS | BODY MASS INDEX: 24.36 KG/M2

## 2020-02-04 DIAGNOSIS — M54.2 NECK PAIN: ICD-10-CM

## 2020-02-04 DIAGNOSIS — M54.12 CERVICAL RADICULOPATHY: Primary | ICD-10-CM

## 2020-02-04 PROCEDURE — 99243 OFF/OP CNSLTJ NEW/EST LOW 30: CPT | Performed by: ORTHOPAEDIC SURGERY

## 2020-02-04 PROCEDURE — 72040 X-RAY EXAM NECK SPINE 2-3 VW: CPT | Performed by: ORTHOPAEDIC SURGERY

## 2020-02-04 NOTE — H&P
NURSING INTAKE COMMENTS: Patient presents with:  Consult: C/o on/off lower neck pain that will radiate down to his right shoulder. Recalls no recent injuries. Has tried biofreeze and heating pad with some relief.   At todays visit he stts that pain has im Maternal Grandfather         Skin cancer of the ears   • Heart Disorder Paternal Grandfather        Social History    Occupational History      Not on file    Tobacco Use      Smoking status: Current Every Day Smoker        Packs/day: 1.00        Years: 10 muscle tenderness to palpation there is no midline tenderness of the cervical spine or palpable step-off or defect. Imaging: No results found. Cervical spine x-rays obtained today were reviewed and are unremarkable for any acute fracture.   Very mild

## 2020-07-29 DIAGNOSIS — E78.00 HYPERCHOLESTEROLEMIA: ICD-10-CM

## 2020-07-29 RX ORDER — ATORVASTATIN CALCIUM 40 MG/1
TABLET, FILM COATED ORAL
Qty: 90 TABLET | Refills: 0 | Status: SHIPPED | OUTPATIENT
Start: 2020-07-29 | End: 2020-11-10

## 2020-07-29 NOTE — TELEPHONE ENCOUNTER
Spoke with patient on phone, he agreed to call back when able to schedule physical. Sent [email or web address] message as well per pt request

## 2020-11-02 ENCOUNTER — TELEPHONE (OUTPATIENT)
Dept: INTERNAL MEDICINE CLINIC | Facility: CLINIC | Age: 40
End: 2020-11-02

## 2020-11-02 DIAGNOSIS — Z00.00 ANNUAL PHYSICAL EXAM: Primary | ICD-10-CM

## 2020-11-02 DIAGNOSIS — E78.00 HYPERCHOLESTEROLEMIA: ICD-10-CM

## 2020-11-02 DIAGNOSIS — Z82.49 FAMILY HISTORY OF CARDIAC ARREST: ICD-10-CM

## 2020-11-02 RX ORDER — ATORVASTATIN CALCIUM 40 MG/1
TABLET, FILM COATED ORAL
Qty: 90 TABLET | Refills: 0 | OUTPATIENT
Start: 2020-11-02

## 2020-11-02 NOTE — TELEPHONE ENCOUNTER
Dr. Tom Cleary, patient is schedule for a Physical on 11/10/2020. Patient is requesting blood test order for appointment. Please advise.

## 2020-11-02 NOTE — TELEPHONE ENCOUNTER
Spouse/made an appointment for the patient to see Dr. Jake Diaz for his physical on 11-10-20 at 6:30. Spouse, would like to know if the doctor can give him orders to do his blood work prior to his appointment.  If so, please, call spouse or pt when the orders a

## 2020-11-03 NOTE — TELEPHONE ENCOUNTER
Let message for Leonel Means letting her know labs have been placed for [facility]. Central scheduling number was provided and was told to fast 8 hours.

## 2020-11-03 NOTE — TELEPHONE ENCOUNTER
Orders placed. Have him do 1 to 2 days before his appointment. Needs to be fasting for at least 8 hours.

## 2020-11-07 ENCOUNTER — LAB ENCOUNTER (OUTPATIENT)
Dept: LAB | Age: 40
End: 2020-11-07
Attending: INTERNAL MEDICINE
Payer: COMMERCIAL

## 2020-11-07 DIAGNOSIS — Z00.00 ANNUAL PHYSICAL EXAM: ICD-10-CM

## 2020-11-07 DIAGNOSIS — E78.00 HYPERCHOLESTEROLEMIA: ICD-10-CM

## 2020-11-07 DIAGNOSIS — Z82.49 FAMILY HISTORY OF CARDIAC ARREST: ICD-10-CM

## 2020-11-07 PROCEDURE — 80053 COMPREHEN METABOLIC PANEL: CPT

## 2020-11-07 PROCEDURE — 84443 ASSAY THYROID STIM HORMONE: CPT

## 2020-11-07 PROCEDURE — 85025 COMPLETE CBC W/AUTO DIFF WBC: CPT

## 2020-11-07 PROCEDURE — 80061 LIPID PANEL: CPT

## 2020-11-07 PROCEDURE — 36415 COLL VENOUS BLD VENIPUNCTURE: CPT

## 2020-11-10 ENCOUNTER — OFFICE VISIT (OUTPATIENT)
Dept: INTERNAL MEDICINE CLINIC | Facility: CLINIC | Age: 40
End: 2020-11-10
Payer: COMMERCIAL

## 2020-11-10 VITALS
SYSTOLIC BLOOD PRESSURE: 127 MMHG | DIASTOLIC BLOOD PRESSURE: 81 MMHG | TEMPERATURE: 98 F | HEIGHT: 71 IN | WEIGHT: 170.63 LBS | HEART RATE: 87 BPM | BODY MASS INDEX: 23.89 KG/M2

## 2020-11-10 DIAGNOSIS — E78.00 HYPERCHOLESTEROLEMIA: ICD-10-CM

## 2020-11-10 DIAGNOSIS — Z82.49 FAMILY HISTORY OF CARDIAC ARREST: ICD-10-CM

## 2020-11-10 DIAGNOSIS — Z30.09 ENCOUNTER FOR VASECTOMY COUNSELING: ICD-10-CM

## 2020-11-10 DIAGNOSIS — Z00.00 ANNUAL PHYSICAL EXAM: Primary | ICD-10-CM

## 2020-11-10 DIAGNOSIS — F17.210 CIGARETTE NICOTINE DEPENDENCE WITHOUT COMPLICATION: ICD-10-CM

## 2020-11-10 DIAGNOSIS — D12.6 ADENOMATOUS POLYP OF COLON, UNSPECIFIED PART OF COLON: ICD-10-CM

## 2020-11-10 PROCEDURE — 3079F DIAST BP 80-89 MM HG: CPT | Performed by: INTERNAL MEDICINE

## 2020-11-10 PROCEDURE — 3008F BODY MASS INDEX DOCD: CPT | Performed by: INTERNAL MEDICINE

## 2020-11-10 PROCEDURE — 3074F SYST BP LT 130 MM HG: CPT | Performed by: INTERNAL MEDICINE

## 2020-11-10 PROCEDURE — 99396 PREV VISIT EST AGE 40-64: CPT | Performed by: INTERNAL MEDICINE

## 2020-11-10 RX ORDER — TRIAMCINOLONE ACETONIDE 5 MG/G
OINTMENT TOPICAL
Qty: 60 G | Refills: 3 | Status: SHIPPED | OUTPATIENT
Start: 2020-11-10

## 2020-11-10 RX ORDER — ATORVASTATIN CALCIUM 40 MG/1
40 TABLET, FILM COATED ORAL NIGHTLY
Qty: 90 TABLET | Refills: 3 | Status: SHIPPED | OUTPATIENT
Start: 2020-11-10 | End: 2022-01-26

## 2020-11-11 NOTE — PROGRESS NOTES
Patient ID: Tye Garcia is a 38year old male. Patient presents with:  Physical: Pt states here for px       HISTORY OF PRESENT ILLNESS:   HPI  Patient presents for above.   Here for annual physical and discuss multiple medical problems.     History Comment:Other reaction(s): rashes    Social History    Socioeconomic History      Marital status:       Spouse name: Not on file      Number of children: Not on file      Years of education: Not on file      Highest education level: Not on file Back Care: Not Asked        Exercise: Not Asked        Bike Helmet: Not Asked        Seat Belt: Not Asked        Self-Exams: Not Asked        Grew up on a farm: No        History of tanning: No        Outdoor occupation: Yes        Pt has a pacemaker CA 9.0 11/07/2020    OSMOCALC 288 11/07/2020    ALKPHO 108 11/07/2020    AST 33 11/07/2020    ALT 55 11/07/2020    ALKPHOS 91 05/26/2012    BILT 0.5 11/07/2020    TP 7.0 11/07/2020    ALB 3.8 11/07/2020    GLOBULIN 3.2 11/07/2020    AGRATIO 1.2 05/26/2012 Assess: We asked about/assessed behavioral health risk and factors affecting choice of behavior change goals/methods. Specifically I asked about readiness to quit tobacco.  Advise:  We gave clear, specific, and personalized behavior change advice, includin

## 2020-11-11 NOTE — PATIENT INSTRUCTIONS
Prevention Guidelines, Men Ages 36 to 52  Screening tests and vaccines are an important part of managing your health. A screening test is done to find possible disorders or diseases in people who don't have any symptoms.  The goal is to find a disease early Syphilis Men at increased risk for infection – talk with your healthcare provider At routine exams   Tuberculosis Men at increased risk for infection – talk with your healthcare provider Check with your healthcare provider   Vision All men in this age [de-identified] Diet and exercise Men who are overweight or obese When diagnosed, and then at routine exams   Sexually transmitted infection prevention Men at increased risk for infection – talk with your healthcare provider At routine exams   Use of daily aspirin Men age Support at home is important too. Nonsmokers can offer praise and encouragement. If the smoker in your life finds it hard to quit, encourage them to keep trying. Over-the-counter medicines  Nicotine replacement therapy may make quitting easier.  Certain ai Date Last Reviewed: 3/1/2017  © 0802-5265 [address]. 1407 [facility] [place], [address] [place]. All rights reserved. This information is not intended as a substitute for professional medical care.  Always follow your healthcare professional'

## 2021-04-19 NOTE — PROGRESS NOTES
HPI:    Patient ID: Verena Sosa is a 38year old man with history of smoking who returns for follow-up regarding previous GERD symptoms and history of adenomatous colon polyp. GERD symptoms are pretty much quiescent.   Sounds like with some minor d bowel movements at least once per day. No GERD symptoms or dysphagia. Again no family history of colon cancer.     Mr Marlein Vasquez continues to smoke.    ======================    GI PROGRESS NOTE   _________________________________________________________ aggravated by heavier meals, alcohol, greasy foods. He is just finishing a 30 day prescription of pantoprazole from Dr. Leticia Cabral which he states has been helping. He is taking it in the mornings. No associated dysphagia with the GERD. He is a smoker. dinner as his symptoms are primarily nocturnal and in the morning. We discussed triggers for GERD today including caffeine, alcohol, heavy greasy meals, tomatoes, citrus. He is aware of several of these. No dysphagia with the GERD symptoms as above.  I did ileocecal valve. The quality of the prep was good. Retroflexion was performed in the rectum. COLONOSCOPY FINDINGS:  There were indeed medium sized, somewhat inflamed and macerated internal hemorrhoids in the distal rectal vault.   Suspect bleeding from Followup above colon polyp pathology results. 2.  High fiber diet. 3.  Repeat colonoscopy examination in three to five years as per polyp pathology results. 4.  If bleeding persists, consider the HET hemorrhoid ablation treatment.       Electronically Ap The scope was drawn back to the stomach, air and secretions suctioned out, and the scope was withdrawn from the patient. IMPRESSION:  Mild reflux changes above a 3-4 cm sliding hiatal hernia as above.      RECOMMENDATIONS:  1.  H2 blocker or PPI medicati taken.  Multiple red well signs on at least 3 hemorrhoid plexuses coming down to the dentate line.     RECOMMENDATIONS:  · High fiber diet. · Consider General Surgery consultation if intervention is desired on these hemorrhoids.   · Repeat colonoscopy exam call us back with their decision. Currently taking aspirin 81 mg daily, atorvastatin lipid-lowering therapy which likely will reduce risk of future colon polyps, colon cancer    2.   GERD symptoms  Currently quiesced sent, controlled with changes to diet a

## 2021-10-21 ENCOUNTER — OFFICE VISIT (OUTPATIENT)
Dept: SURGERY | Facility: CLINIC | Age: 41
End: 2021-10-21
Payer: COMMERCIAL

## 2021-10-21 VITALS
DIASTOLIC BLOOD PRESSURE: 80 MMHG | SYSTOLIC BLOOD PRESSURE: 120 MMHG | HEIGHT: 71 IN | WEIGHT: 169 LBS | BODY MASS INDEX: 23.66 KG/M2

## 2021-10-21 DIAGNOSIS — Z30.09 VASECTOMY EVALUATION: Primary | ICD-10-CM

## 2021-10-21 PROCEDURE — 3074F SYST BP LT 130 MM HG: CPT | Performed by: UROLOGY

## 2021-10-21 PROCEDURE — 99244 OFF/OP CNSLTJ NEW/EST MOD 40: CPT | Performed by: UROLOGY

## 2021-10-21 PROCEDURE — 3008F BODY MASS INDEX DOCD: CPT | Performed by: UROLOGY

## 2021-10-21 PROCEDURE — 3079F DIAST BP 80-89 MM HG: CPT | Performed by: UROLOGY

## 2021-10-21 RX ORDER — DIAZEPAM 10 MG/1
10 TABLET ORAL ONCE
Qty: 1 TABLET | Refills: 0 | Status: SHIPPED | OUTPATIENT
Start: 2021-10-21 | End: 2021-10-21

## 2021-10-21 RX ORDER — HYDROCODONE BITARTRATE AND ACETAMINOPHEN 5; 325 MG/1; MG/1
2 TABLET ORAL
Qty: 2 TABLET | Refills: 0 | Status: SHIPPED | OUTPATIENT
Start: 2021-10-21 | End: 2021-10-21

## 2021-10-21 NOTE — PROGRESS NOTES
[facility], [facility]  Initial Office Consultation    HPI:   Jayna Weiner is a 42year old male here today for consultation at the request of, and a copy of this note will be sent to, Sunita Garcia MD.    Patient presents today in consultation for fur Testes: Normal.         Right: Mass, tenderness or swelling not present. Left: Mass, tenderness or swelling not present. Epididymis:      Right: Normal.      Left: Normal.      Comments: Normal spermatic cord structures bilaterally.   Vasa palp vasectomy. · Options for fertility after vasectomy include vasectomy reversal and sperm retrieval with in vitro fertilization. These options are not always successful, and they may be expensive.     · The rates of surgical complications such as symptomat

## 2021-11-18 ENCOUNTER — OFFICE VISIT (OUTPATIENT)
Dept: INTERNAL MEDICINE CLINIC | Facility: CLINIC | Age: 41
End: 2021-11-18
Payer: COMMERCIAL

## 2021-11-18 ENCOUNTER — MED REC SCAN ONLY (OUTPATIENT)
Dept: INTERNAL MEDICINE CLINIC | Facility: CLINIC | Age: 41
End: 2021-11-18

## 2021-11-18 VITALS
DIASTOLIC BLOOD PRESSURE: 70 MMHG | HEIGHT: 71 IN | WEIGHT: 172 LBS | TEMPERATURE: 98 F | OXYGEN SATURATION: 96 % | HEART RATE: 91 BPM | SYSTOLIC BLOOD PRESSURE: 111 MMHG | BODY MASS INDEX: 24.08 KG/M2

## 2021-11-18 DIAGNOSIS — R05.9 COUGH: ICD-10-CM

## 2021-11-18 DIAGNOSIS — J02.9 SORE THROAT: Primary | ICD-10-CM

## 2021-11-18 DIAGNOSIS — R09.81 SINUS CONGESTION: ICD-10-CM

## 2021-11-18 PROCEDURE — 99213 OFFICE O/P EST LOW 20 MIN: CPT | Performed by: INTERNAL MEDICINE

## 2021-11-18 PROCEDURE — 3074F SYST BP LT 130 MM HG: CPT | Performed by: INTERNAL MEDICINE

## 2021-11-18 PROCEDURE — 3008F BODY MASS INDEX DOCD: CPT | Performed by: INTERNAL MEDICINE

## 2021-11-18 PROCEDURE — 3078F DIAST BP <80 MM HG: CPT | Performed by: INTERNAL MEDICINE

## 2021-11-18 RX ORDER — AZITHROMYCIN 250 MG/1
TABLET, FILM COATED ORAL
Qty: 6 TABLET | Refills: 0 | Status: SHIPPED | OUTPATIENT
Start: 2021-11-18 | End: 2021-11-23

## 2021-11-18 NOTE — PROGRESS NOTES
Patient ID: Dawood Gastelum is a 42year old male. Patient presents with:  Sinus Problem: On and off congestion 8-9 days. Coughing and sneezing. Last two days sore throat. HISTORY OF PRESENT ILLNESS:   HPI  Patient presents for above.   9-day his 2    Allergies:  Sulfa Antibiotics           Comment:Other reaction(s): rashes    Social History    Socioeconomic History      Marital status:       Spouse name: Not on file      Number of children: Not on file      Years of education: Not on file Weight: 172 lb (78 kg)   Height: 5' 11\" (1.803 m)       Body mass index is 23.99 kg/m². Physical Exam  Constitutional:       Appearance: Normal appearance.    HENT:      Right Ear: Tympanic membrane, ear canal and external ear normal. There is no impa every attempt is made to correct errors during dictation discrepancies may still exist.    Jennifer Moura MD  11/18/2021

## 2022-01-11 ENCOUNTER — TELEPHONE (OUTPATIENT)
Dept: SURGERY | Facility: CLINIC | Age: 42
End: 2022-01-11

## 2022-01-26 DIAGNOSIS — E78.00 HYPERCHOLESTEROLEMIA: ICD-10-CM

## 2022-01-26 RX ORDER — ATORVASTATIN CALCIUM 40 MG/1
40 TABLET, FILM COATED ORAL NIGHTLY
Qty: 90 TABLET | Refills: 1 | Status: SHIPPED | OUTPATIENT
Start: 2022-01-26

## 2022-01-26 NOTE — TELEPHONE ENCOUNTER
Please review; protocol failed.     Requested Prescriptions   Pending Prescriptions Disp Refills    ATORVASTATIN 40 MG Oral Tab [Pharmacy Med Name: ATORVASTATIN 40 MG TABLET] 90 tablet 3     Sig: TAKE 1 TABLET BY MOUTH EVERY DAY AT NIGHT        Cholesterol

## 2022-04-26 ENCOUNTER — MED REC SCAN ONLY (OUTPATIENT)
Dept: INTERNAL MEDICINE CLINIC | Facility: CLINIC | Age: 42
End: 2022-04-26

## 2022-06-01 ENCOUNTER — TELEPHONE (OUTPATIENT)
Dept: SURGERY | Facility: CLINIC | Age: 42
End: 2022-06-01

## 2022-06-01 NOTE — TELEPHONE ENCOUNTER
This RN called wife, Javier Calero [phone]   In response to her inquiry. Inquiring confirmation of the appt dates and locations. RN explained consultation on 6/6 at [facility], 6/9 Vasectomy. Need a  on procedure day. Meds explained and to bring scrotal support day of procedure as well. She agreed to plans and verbalized understanding.

## 2022-06-01 NOTE — TELEPHONE ENCOUNTER
Pre spouse calling states she has questions regarding pt's procedure states was told MD is using clips to preform the procedure and pt is freaked out about clips on him please advise

## 2022-06-01 NOTE — TELEPHONE ENCOUNTER
I called pt; wife answered states pt made another arron for Monday with Dr. Lisa Lagos  to ask any additional questions that he has before his Thursday 6/9/22 office  Procedure.

## 2022-06-06 ENCOUNTER — OFFICE VISIT (OUTPATIENT)
Dept: SURGERY | Facility: CLINIC | Age: 42
End: 2022-06-06
Payer: COMMERCIAL

## 2022-06-06 DIAGNOSIS — Z30.09 VASECTOMY EVALUATION: Primary | ICD-10-CM

## 2022-06-09 ENCOUNTER — PROCEDURE (OUTPATIENT)
Dept: SURGERY | Facility: CLINIC | Age: 42
End: 2022-06-09
Payer: COMMERCIAL

## 2022-06-09 VITALS — HEART RATE: 99 BPM | DIASTOLIC BLOOD PRESSURE: 76 MMHG | SYSTOLIC BLOOD PRESSURE: 113 MMHG

## 2022-06-09 DIAGNOSIS — Z30.2 ENCOUNTER FOR VASECTOMY: ICD-10-CM

## 2022-06-09 DIAGNOSIS — Z30.2 ENCOUNTER FOR STERILIZATION: Primary | ICD-10-CM

## 2022-06-09 PROCEDURE — 3074F SYST BP LT 130 MM HG: CPT | Performed by: UROLOGY

## 2022-06-09 PROCEDURE — 3078F DIAST BP <80 MM HG: CPT | Performed by: UROLOGY

## 2022-06-09 PROCEDURE — 55250 REMOVAL OF SPERM DUCT(S): CPT | Performed by: UROLOGY

## 2022-06-09 NOTE — PROCEDURES
UROLOGY PROCEDURE NOTE  NO-SCALPEL BILATERAL VASECTOMY      PREOPERATIVE DIAGNOSIS: Desires voluntary sterilization - bilateral vasectomy. POSTOPERATIVE DIAGNOSIS: Desires voluntary sterilization - bilateral vasectomy. PROCEDURE PERFORMED: Voluntary sterilization - bilateral no-scalpel vasectomy. ANESTHESIA: Diazepam 10 mg orally and 2 pills of hydrocodone 5/325 mg orally and 2% Xylocaine injectable. INDICATIONS:   Before surgery today, I once again discussed  with the patient the following issues, complications, side effects of vasectomy: possible failure of the procedure with possibility that patient could still end up making his wife pregnant, despite undergoing this procedure; bleeding complications; wound infection; pain that might never go away; the fact that he is still fertile immediately after the procedure; that if he changes his mind, a reversal operation may not  be successful; as well as other side effects and complications and the patient understands and still wishes to proceed and feels comfortable with his decision. DESCRIPTION OF THE PROCEDURE:      The patient was given the anesthesia as indicated above. He was prepped and draped in usual sterile fashion. The right vas deferens was isolated under the scrotal skin. The skin over it was infiltrated with 2% Plain Xylocaine. A small opening was made in the skin over the vas; the vas was delivered out of the wound; it was skeletonized and exposed for a 3-4 cm segment. Hemostasis was controlled by electrocoagulation. A 1 cm segment was excised. The ends were fulgurated. One medium clip was placed on the testicular side and one medium clip was placed on the abdominal side. The ends were then allowed to fall back into the wound. The exact same procedure was performed on the contralateral side through the same skin opening. At the end of the procedure, the skin was approximated using Skin Affix topical skin adhesive.   Each segment of vas deferens was sent separately in formalin to pathology for identification. The patient tolerated the procedure well.      Kirstie Jin MD  06/09/22

## 2022-07-20 ENCOUNTER — OFFICE VISIT (OUTPATIENT)
Dept: SURGERY | Facility: CLINIC | Age: 42
End: 2022-07-20
Payer: COMMERCIAL

## 2022-07-20 ENCOUNTER — LAB ENCOUNTER (OUTPATIENT)
Dept: LAB | Facility: HOSPITAL | Age: 42
End: 2022-07-20
Attending: UROLOGY
Payer: COMMERCIAL

## 2022-07-20 DIAGNOSIS — Z30.2 ENCOUNTER FOR VASECTOMY: ICD-10-CM

## 2022-07-20 DIAGNOSIS — Z98.52 S/P VASECTOMY: Primary | ICD-10-CM

## 2022-07-20 PROCEDURE — 99024 POSTOP FOLLOW-UP VISIT: CPT | Performed by: NURSE PRACTITIONER

## 2022-07-20 PROCEDURE — 89321 SEMEN ANAL SPERM DETECTION: CPT

## 2022-07-31 DIAGNOSIS — E78.00 HYPERCHOLESTEROLEMIA: ICD-10-CM

## 2022-08-01 RX ORDER — ATORVASTATIN CALCIUM 40 MG/1
TABLET, FILM COATED ORAL
Qty: 90 TABLET | Refills: 1 | Status: SHIPPED | OUTPATIENT
Start: 2022-08-01

## 2022-10-07 ENCOUNTER — VIRTUAL PHONE E/M (OUTPATIENT)
Dept: INTERNAL MEDICINE CLINIC | Facility: CLINIC | Age: 42
End: 2022-10-07
Payer: COMMERCIAL

## 2022-10-07 ENCOUNTER — NURSE TRIAGE (OUTPATIENT)
Dept: INTERNAL MEDICINE CLINIC | Facility: CLINIC | Age: 42
End: 2022-10-07

## 2022-10-07 DIAGNOSIS — J01.90 ACUTE NON-RECURRENT SINUSITIS, UNSPECIFIED LOCATION: Primary | ICD-10-CM

## 2022-10-07 PROCEDURE — 99442 PHONE E/M BY PHYS 11-20 MIN: CPT | Performed by: INTERNAL MEDICINE

## 2022-10-07 RX ORDER — AMOXICILLIN AND CLAVULANATE POTASSIUM 875; 125 MG/1; MG/1
1 TABLET, FILM COATED ORAL 2 TIMES DAILY
Qty: 14 TABLET | Refills: 0 | Status: SHIPPED | OUTPATIENT
Start: 2022-10-07 | End: 2022-10-17

## 2022-11-07 ENCOUNTER — NURSE TRIAGE (OUTPATIENT)
Dept: INTERNAL MEDICINE CLINIC | Facility: CLINIC | Age: 42
End: 2022-11-07

## 2022-11-07 ENCOUNTER — HOSPITAL ENCOUNTER (OUTPATIENT)
Dept: GENERAL RADIOLOGY | Age: 42
Discharge: HOME | End: 2022-11-07
Attending: NURSE PRACTITIONER
Payer: COMMERCIAL

## 2022-11-07 ENCOUNTER — OFFICE VISIT (OUTPATIENT)
Dept: INTERNAL MEDICINE CLINIC | Facility: CLINIC | Age: 42
End: 2022-11-07
Payer: COMMERCIAL

## 2022-11-07 VITALS
DIASTOLIC BLOOD PRESSURE: 86 MMHG | HEART RATE: 91 BPM | BODY MASS INDEX: 23.66 KG/M2 | SYSTOLIC BLOOD PRESSURE: 133 MMHG | WEIGHT: 169 LBS | HEIGHT: 71 IN | OXYGEN SATURATION: 97 %

## 2022-11-07 DIAGNOSIS — S99.922A FOOT INJURY, LEFT, INITIAL ENCOUNTER: ICD-10-CM

## 2022-11-07 DIAGNOSIS — S99.922A FOOT INJURY, LEFT, INITIAL ENCOUNTER: Primary | ICD-10-CM

## 2022-11-07 PROCEDURE — 3008F BODY MASS INDEX DOCD: CPT | Performed by: NURSE PRACTITIONER

## 2022-11-07 PROCEDURE — 73630 X-RAY EXAM OF FOOT: CPT | Performed by: NURSE PRACTITIONER

## 2022-11-07 PROCEDURE — 99213 OFFICE O/P EST LOW 20 MIN: CPT | Performed by: NURSE PRACTITIONER

## 2022-11-07 PROCEDURE — 3079F DIAST BP 80-89 MM HG: CPT | Performed by: NURSE PRACTITIONER

## 2022-11-07 PROCEDURE — 3075F SYST BP GE 130 - 139MM HG: CPT | Performed by: NURSE PRACTITIONER

## 2023-02-07 DIAGNOSIS — E78.00 HYPERCHOLESTEROLEMIA: ICD-10-CM

## 2023-02-08 RX ORDER — ATORVASTATIN CALCIUM 40 MG/1
TABLET, FILM COATED ORAL
Qty: 90 TABLET | Refills: 1 | OUTPATIENT
Start: 2023-02-08

## 2023-02-08 NOTE — TELEPHONE ENCOUNTER
[employer] message sent to patient to schedule physical.  Last labs 11/7/20. Please advise on lab orders and refill. Requested Prescriptions     Pending Prescriptions Disp Refills    ATORVASTATIN 40 MG Oral Tab [Pharmacy Med Name: ATORVASTATIN 40 MG TABLET] 90 tablet 1     Sig: TAKE 1 TABLET BY MOUTH EVERY DAY AT NIGHT      Recent Visits  Date Type Provider Dept   11/18/21 Office Visit Katharina Moe MD [facility]   Showing recent visits within past 540 days with a meds authorizing provider and meeting all other requirements  Future Appointments  No visits were found meeting these conditions.   Showing future appointments within next 150 days with a meds authorizing provider and meeting all other requirements     Requested Prescriptions   Pending Prescriptions Disp Refills    ATORVASTATIN 40 MG Oral Tab [Pharmacy Med Name: ATORVASTATIN 40 MG TABLET] 90 tablet 1     Sig: TAKE 1 TABLET BY MOUTH EVERY DAY AT NIGHT       Cholesterol Medication Protocol Failed - 2/7/2023 12:02 AM        Failed - ALT in past 12 months        Failed - LDL in past 12 months        Failed - Last ALT < 80     Lab Results   Component Value Date    ALT 55 11/07/2020             Failed - Last LDL < 130     Lab Results   Component Value Date    LDL 83 11/07/2020             Passed - In person appointment or virtual visit in the past 12 mos or appointment in next 3 mos     Recent Outpatient Visits              3 months ago Foot injury, left, initial encounter    [address], [place], Sim [place]., APRN    Office Visit    4 months ago Acute non-recurrent sinusitis, unspecified location    Ender Arvizu MD    [facility]    6 months ago S/P vasectomy    [address], [address] [facility],[address], [employer], [address], APRN    Office Visit    8 months ago Vasectomy evaluation    [address], [address], [place] Becky Caldwell MD    Office Visit    1 year ago Sore throat    Noemi Bhat MD    Office Visit                             Recent Outpatient Visits              3 months ago Foot injury, left, initial encounter    [address],[address], [facility], [facility]., APRN    Office Visit    4 months ago Acute non-recurrent sinusitis, unspecified location    Herlinda Guillaume MD    [facility]    6 months ago S/P vasectomy    [facility], [employer], [address], APRN    Office Visit    8 months ago Vasectomy evaluation    [facility], Fay Loomis MD    Office Visit    1 year ago Sore throat    Tien Bhat MD    Office Visit

## 2023-02-10 NOTE — TELEPHONE ENCOUNTER
Spoke to patient, understands he needs to schedule a physical and have labs done. He [facility]

## 2023-02-13 RX ORDER — ATORVASTATIN CALCIUM 40 MG/1
40 TABLET, FILM COATED ORAL NIGHTLY
Qty: 30 TABLET | Refills: 0 | Status: SHIPPED | OUTPATIENT
Start: 2023-02-13

## 2023-02-13 NOTE — TELEPHONE ENCOUNTER
Pended the Atorvastatin for #30.   Appointment was made f  Future Appointments   Date Time Provider Haja Rollins   2/22/2023  8:00 AM Royal Magallanes MD [facility] Isela Ball

## 2023-03-13 DIAGNOSIS — E78.00 HYPERCHOLESTEROLEMIA: ICD-10-CM

## 2023-03-14 RX ORDER — ATORVASTATIN CALCIUM 40 MG/1
40 TABLET, FILM COATED ORAL NIGHTLY
Qty: 30 TABLET | Refills: 0 | Status: SHIPPED | OUTPATIENT
Start: 2023-03-14

## 2023-03-14 NOTE — TELEPHONE ENCOUNTER
Protocol failed or has No Protocol, please review  Requested Prescriptions   Pending Prescriptions Disp Refills    ATORVASTATIN 40 MG Oral Tab [Pharmacy Med Name: ATORVASTATIN 40 MG TABLET] 30 tablet 0     Sig: TAKE 1 TABLET BY MOUTH EVERY DAY AT NIGHT       Cholesterol Medication Protocol Failed - 3/13/2023  1:31 PM        Failed - ALT in past 12 months        Failed - LDL in past 12 months        Failed - Last ALT < 80     Lab Results   Component Value Date    ALT 55 11/07/2020             Failed - Last LDL < 130     Lab Results   Component Value Date    LDL 83 11/07/2020             Passed - In person appointment or virtual visit in the past 12 mos or appointment in next 3 mos     Recent Outpatient Visits              4 months ago Foot injury, left, initial encounter    [address], [facility], APRN    Office Visit    5 months ago Acute non-recurrent sinusitis, unspecified location    Dionne Spann MD    [facility]    7 months ago S/P vasectomy    [address], [address] [facility],[address], [employer], [address], APRN    Office Visit    9 months ago Vasectomy evaluation    Fredis Jenkins, Mehrdad Mcclelland MD    Office Visit    1 year ago Sore throat    Tien Spann MD    Office Visit          Future Appointments         Provider Department Appt Notes    In 4 weeks Ej Hassan MD [address], [address] [facility] physical    In 5 months Bel Nolasco MD [address], [address] [facility] follow up and approval for colonoscopy                  Future Appointments         Provider Department Appt Notes    In 4 weeks Ej Hassan MD [address], [address] [facility] physical    In 5 months Bel Nolasco MD [facility], [address] [facility] [address] follow up and approval for colonoscopy          Recent Outpatient Visits              4 months ago Foot injury, left, initial encounter    [address], [facility]., APRN    Office Visit    5 months ago Acute non-recurrent sinusitis, unspecified location    Leena Wen MD    [facility]    7 months ago S/P vasectomy    [facility], PATRICIA Patel    Office Visit    9 months ago Vasectomy evaluation    [facility], Meenu Diamond MD    Office Visit    1 year ago Sore throat    José Miguel Terry, Tien Howell MD    Office Visit

## 2023-04-11 ENCOUNTER — OFFICE VISIT (OUTPATIENT)
Dept: INTERNAL MEDICINE CLINIC | Facility: CLINIC | Age: 43
End: 2023-04-11

## 2023-04-11 VITALS
OXYGEN SATURATION: 98 % | HEART RATE: 95 BPM | WEIGHT: 174 LBS | BODY MASS INDEX: 24.36 KG/M2 | DIASTOLIC BLOOD PRESSURE: 82 MMHG | HEIGHT: 71 IN | SYSTOLIC BLOOD PRESSURE: 118 MMHG

## 2023-04-11 DIAGNOSIS — F17.210 CIGARETTE NICOTINE DEPENDENCE WITHOUT COMPLICATION: ICD-10-CM

## 2023-04-11 DIAGNOSIS — E78.00 HYPERCHOLESTEROLEMIA: ICD-10-CM

## 2023-04-11 DIAGNOSIS — Z23 NEED FOR VACCINATION: ICD-10-CM

## 2023-04-11 DIAGNOSIS — Z00.00 ANNUAL PHYSICAL EXAM: Primary | ICD-10-CM

## 2023-04-11 DIAGNOSIS — D12.6 ADENOMATOUS POLYP OF COLON, UNSPECIFIED PART OF COLON: ICD-10-CM

## 2023-04-11 DIAGNOSIS — Z12.11 COLON CANCER SCREENING: ICD-10-CM

## 2023-04-11 DIAGNOSIS — Z82.49 FAMILY HISTORY OF CARDIAC ARREST: ICD-10-CM

## 2023-04-11 PROCEDURE — 99406 BEHAV CHNG SMOKING 3-10 MIN: CPT | Performed by: INTERNAL MEDICINE

## 2023-04-11 PROCEDURE — 3074F SYST BP LT 130 MM HG: CPT | Performed by: INTERNAL MEDICINE

## 2023-04-11 PROCEDURE — 99396 PREV VISIT EST AGE 40-64: CPT | Performed by: INTERNAL MEDICINE

## 2023-04-11 PROCEDURE — 90715 TDAP VACCINE 7 YRS/> IM: CPT | Performed by: INTERNAL MEDICINE

## 2023-04-11 PROCEDURE — 3008F BODY MASS INDEX DOCD: CPT | Performed by: INTERNAL MEDICINE

## 2023-04-11 PROCEDURE — 90471 IMMUNIZATION ADMIN: CPT | Performed by: INTERNAL MEDICINE

## 2023-04-11 PROCEDURE — 3079F DIAST BP 80-89 MM HG: CPT | Performed by: INTERNAL MEDICINE

## 2023-04-13 DIAGNOSIS — E78.00 HYPERCHOLESTEROLEMIA: ICD-10-CM

## 2023-04-13 RX ORDER — ATORVASTATIN CALCIUM 40 MG/1
40 TABLET, FILM COATED ORAL NIGHTLY
Qty: 90 TABLET | Refills: 3 | Status: SHIPPED | OUTPATIENT
Start: 2023-04-13

## 2023-04-13 NOTE — TELEPHONE ENCOUNTER
Patient's spouse, Barney Rodrigues, on BET[employer] is requesting the medication refill and mentions patient is low on dosage.   Please advise    Atorvastatin

## 2023-04-13 NOTE — TELEPHONE ENCOUNTER
Please review. Protocol failed/ No protocol      Requested Prescriptions   Pending Prescriptions Disp Refills    atorvastatin 40 MG Oral Tab 30 tablet 0     Sig: Take 1 tablet (40 mg total) by mouth nightly.        Cholesterol Medication Protocol Failed - 4/13/2023  1:46 PM        Failed - ALT in past 12 months        Failed - LDL in past 12 months        Failed - Last ALT < 80     Lab Results   Component Value Date    ALT 55 11/07/2020             Failed - Last LDL < 130     Lab Results   Component Value Date    LDL 83 11/07/2020               Passed - In person appointment or virtual visit in the past 12 mos or appointment in next 3 mos     Recent Outpatient Visits              2 days ago Annual physical exam    Josseline Diallo MD    Office Visit    5 months ago Foot injury, left, initial encounter    [address], [place], [facility]., APRN    Office Visit    6 months ago Acute non-recurrent sinusitis, unspecified location    Karlene Wilson MD    [facility]    8 months ago S/P vasectomy    [address], [address] [place] [address],[address], [employer], [address], APRN    Office Visit    10 months ago Vasectomy evaluation    Lonza Andi, Fabien Felix MD    Office Visit          Future Appointments         Provider Department Appt Notes    In 4 months Dianne Mauricio MD [address], [address] [facility] follow up and approval for colonoscopy                    Future Appointments         Provider Department Appt Notes    In 4 months Dianne Mauricio MD [address], [facility] and approval for [facility]             Recent Outpatient Visits              2 days ago Annual physical exam    [address], [facility] Tan Durán MD    Office Visit    5 months ago Foot injury, left, initial encounter    [address] [place],[address], [address], [place], Aida Bond., APRN    Office Visit    6 months ago Acute non-recurrent sinusitis, unspecified location    Magda Sapp MD    [facility]    8 months ago S/P vasectomy    [address] [facility], [employer], [address], APRN    Office Visit    10 months ago Vasectomy evaluation    [address] [facility], Sean Fisher MD    Office Visit

## 2023-07-22 ENCOUNTER — LAB ENCOUNTER (OUTPATIENT)
Dept: LAB | Age: 43
End: 2023-07-22
Attending: INTERNAL MEDICINE
Payer: COMMERCIAL

## 2023-07-22 DIAGNOSIS — Z82.49 FAMILY HISTORY OF CARDIAC ARREST: ICD-10-CM

## 2023-07-22 DIAGNOSIS — E78.00 HYPERCHOLESTEROLEMIA: ICD-10-CM

## 2023-07-22 DIAGNOSIS — Z00.00 ANNUAL PHYSICAL EXAM: ICD-10-CM

## 2023-07-22 LAB
ALBUMIN SERPL-MCNC: 3.5 G/DL (ref 3.4–5)
ALBUMIN/GLOB SERPL: 1 {RATIO} (ref 1–2)
ALP LIVER SERPL-CCNC: 114 U/L
ALT SERPL-CCNC: 39 U/L
ANION GAP SERPL CALC-SCNC: 3 MMOL/L (ref 0–18)
AST SERPL-CCNC: 29 U/L (ref 15–37)
BASOPHILS # BLD AUTO: 0.09 X10(3) UL (ref 0–0.2)
BASOPHILS NFR BLD AUTO: 1.1 %
BILIRUB SERPL-MCNC: 0.6 MG/DL (ref 0.1–2)
BUN BLD-MCNC: 13 MG/DL (ref 7–18)
CALCIUM BLD-MCNC: 8.6 MG/DL (ref 8.5–10.1)
CHLORIDE SERPL-SCNC: 107 MMOL/L (ref 98–112)
CHOLEST SERPL-MCNC: 185 MG/DL (ref ?–200)
CO2 SERPL-SCNC: 28 MMOL/L (ref 21–32)
CREAT BLD-MCNC: 0.98 MG/DL
EGFRCR SERPLBLD CKD-EPI 2021: 98 ML/MIN/1.73M2 (ref 60–?)
EOSINOPHIL # BLD AUTO: 0.46 X10(3) UL (ref 0–0.7)
EOSINOPHIL NFR BLD AUTO: 5.4 %
ERYTHROCYTE [DISTWIDTH] IN BLOOD BY AUTOMATED COUNT: 13 %
FASTING PATIENT LIPID ANSWER: YES
FASTING STATUS PATIENT QL REPORTED: YES
GLOBULIN PLAS-MCNC: 3.6 G/DL (ref 2.8–4.4)
GLUCOSE BLD-MCNC: 78 MG/DL (ref 70–99)
HCT VFR BLD AUTO: 47 %
HDLC SERPL-MCNC: 61 MG/DL (ref 40–59)
HGB BLD-MCNC: 15.2 G/DL
IMM GRANULOCYTES # BLD AUTO: 0.01 X10(3) UL (ref 0–1)
IMM GRANULOCYTES NFR BLD: 0.1 %
LDLC SERPL CALC-MCNC: 104 MG/DL (ref ?–100)
LYMPHOCYTES # BLD AUTO: 2.71 X10(3) UL (ref 1–4)
LYMPHOCYTES NFR BLD AUTO: 31.7 %
MCH RBC QN AUTO: 30.3 PG (ref 26–34)
MCHC RBC AUTO-ENTMCNC: 32.3 G/DL (ref 31–37)
MCV RBC AUTO: 93.6 FL
MONOCYTES # BLD AUTO: 0.77 X10(3) UL (ref 0.1–1)
MONOCYTES NFR BLD AUTO: 9 %
NEUTROPHILS # BLD AUTO: 4.5 X10 (3) UL (ref 1.5–7.7)
NEUTROPHILS # BLD AUTO: 4.5 X10(3) UL (ref 1.5–7.7)
NEUTROPHILS NFR BLD AUTO: 52.7 %
NONHDLC SERPL-MCNC: 124 MG/DL (ref ?–130)
OSMOLALITY SERPL CALC.SUM OF ELEC: 285 MOSM/KG (ref 275–295)
PLATELET # BLD AUTO: 259 10(3)UL (ref 150–450)
POTASSIUM SERPL-SCNC: 4.1 MMOL/L (ref 3.5–5.1)
PROT SERPL-MCNC: 7.1 G/DL (ref 6.4–8.2)
RBC # BLD AUTO: 5.02 X10(6)UL
SODIUM SERPL-SCNC: 138 MMOL/L (ref 136–145)
TRIGL SERPL-MCNC: 114 MG/DL (ref 30–149)
TSI SER-ACNC: 2.68 MIU/ML (ref 0.36–3.74)
VLDLC SERPL CALC-MCNC: 19 MG/DL (ref 0–30)
WBC # BLD AUTO: 8.5 X10(3) UL (ref 4–11)

## 2023-07-22 PROCEDURE — 85025 COMPLETE CBC W/AUTO DIFF WBC: CPT

## 2023-07-22 PROCEDURE — 80053 COMPREHEN METABOLIC PANEL: CPT

## 2023-07-22 PROCEDURE — 84443 ASSAY THYROID STIM HORMONE: CPT

## 2023-07-22 PROCEDURE — 36415 COLL VENOUS BLD VENIPUNCTURE: CPT

## 2023-07-22 PROCEDURE — 80061 LIPID PANEL: CPT

## 2023-08-21 ENCOUNTER — TELEPHONE (OUTPATIENT)
Facility: CLINIC | Age: 43
End: 2023-08-21

## 2023-08-21 ENCOUNTER — OFFICE VISIT (OUTPATIENT)
Facility: CLINIC | Age: 43
End: 2023-08-21

## 2023-08-21 VITALS
WEIGHT: 173 LBS | HEIGHT: 70 IN | DIASTOLIC BLOOD PRESSURE: 67 MMHG | BODY MASS INDEX: 24.77 KG/M2 | SYSTOLIC BLOOD PRESSURE: 109 MMHG | HEART RATE: 94 BPM

## 2023-08-21 DIAGNOSIS — Z86.010 HISTORY OF ADENOMATOUS POLYP OF COLON: ICD-10-CM

## 2023-08-21 DIAGNOSIS — K21.9 GASTROESOPHAGEAL REFLUX DISEASE WITHOUT ESOPHAGITIS: ICD-10-CM

## 2023-08-21 DIAGNOSIS — K62.89 ANORECTAL PAIN: Primary | ICD-10-CM

## 2023-08-21 DIAGNOSIS — K63.5 SESSILE COLONIC POLYP: Primary | ICD-10-CM

## 2023-08-21 PROCEDURE — 3078F DIAST BP <80 MM HG: CPT | Performed by: INTERNAL MEDICINE

## 2023-08-21 PROCEDURE — 99213 OFFICE O/P EST LOW 20 MIN: CPT | Performed by: INTERNAL MEDICINE

## 2023-08-21 PROCEDURE — 3008F BODY MASS INDEX DOCD: CPT | Performed by: INTERNAL MEDICINE

## 2023-08-21 PROCEDURE — 3074F SYST BP LT 130 MM HG: CPT | Performed by: INTERNAL MEDICINE

## 2023-08-21 RX ORDER — RISANKIZUMAB-RZAA 150 MG/ML
INJECTION SUBCUTANEOUS
COMMUNITY
Start: 2023-07-05

## 2023-08-21 RX ORDER — SODIUM, POTASSIUM,MAG SULFATES 17.5-3.13G
SOLUTION, RECONSTITUTED, ORAL ORAL
Qty: 1 EACH | Refills: 0 | Status: SHIPPED | OUTPATIENT
Start: 2023-08-21

## 2023-08-21 RX ORDER — FLUOCINOLONE ACETONIDE 0.11 MG/ML
OIL TOPICAL
COMMUNITY
Start: 2023-07-25

## 2023-08-21 NOTE — PROGRESS NOTES
HPI:    Patient ID: Reginald Parker is a 39year old man with history of tobacco smoking who returns for follow-up regarding previous GERD symptoms and history of adenomatous colon polyp. Cayla Hernandez returns today for follow-up regarding his history of sessile serrated adenoma colon polyp found in his late 35s and hemorrhoidal type symptoms. Hemorrhoids remain more of a nuisance than a distressful problem. He describes \"discomfort\" periodically from the hemorrhoids and occasional prolapse symptoms. This is tolerable but annoying. He denies constipation or straining. Previous GERD symptoms much improved. Not currently requiring PPI medication. Now on Skyrizi therapy for ?psoriasis.    ====================  Previous visit 4/19/2021:     Reginald Parker is a 39year old man with history of smoking who returns for follow-up regarding previous GERD symptoms and history of adenomatous colon polyp. GERD symptoms are pretty much quiescent. Sounds like with some minor dietary changes Cayla Hernandez has been doing really well. He is not taking antiacid medication regularly. Takes aspirin 81 mg, atorvastatin medication at present. Ongoing tolerable hemorrhoidal symptoms including occasional prolapse which is uncomfortable but not painful. When a prolapse event occurs, it resolves in a few hours. Occasional rectal bleeding. No concern with constipation. Passes a bowel movement pretty much every day. No changes in family history, no family history colon cancer. Father has diabetes.    ======================  Previous visit 12/18/2014:     Reginald Parker is a 38year old man known to me from previous consultation June 2012 for similar complaint of rectal bleeding. After our previous consultation the bleeding improved. He and his wife decided against colonoscopy examination. The bleeding has increased again and he returns for further evaluation.     Mr Sukumar Roberts again describes intermittent painless rectal bleeding. He describes passage of small volumes of bright red blood with bowel movements about once per month. This is more than streaking, several drops maybe up to a teaspoon. Occasionally blood will drip from the anal area into the toilet. Again this is bright red blood. No significant pain with passing stools. With prolonged standing, he describes fullness, pressure in the anal area. He is very worried that this could be colon cancer. On review of systems, no abnormal weight loss. 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 He asks if stress could play a role here. No change in bowel patterns. He describes very regular bowel movements at least once per day. No GERD symptoms or dysphagia. Again no family history of colon cancer. Mr Sherie Cespedes continues to smoke.    ======================    GI PROGRESS NOTE   ___________________________________________________________________________________________   PATIENT: Izaiah Story   YOB: 1980 27Years old   DATE: 06/14/2012 4:30 PM   MEDICAL RECORD NUMBER: [id number]   VISIT TYPE: Consult   ___________________________________________________________________________________________   This 34-year old male patient was referred by [facility] for consultation. Chief Complaint/Reason for visit:   This 27year old male presents with rectal bleeding and acid reflux. History of Present Illness   1. Rectal bleeding   2. Acid reflux   Mr Izaiah Story is a healthy 34-year-old man referred by Dr. Summer Batista for evaluation of recent painless rectal bleeding and GERD symptoms. He comes in describing less than one year of intermittent blood streaking with stools. Specifically, he usually sees blood streaking on toilet paper with wiping himself.  He states that this is almost always provoked by spicy meals, hot peppers, giardiniera topping. His bleeding is entirely painless. No change in bowel patterns. He denies constipation; he has a bowel movement twice daily on a very consistent schedule. When he discovered the connection between spices, hot sauce, giardiniera, he cut back on his intake of these and the bleeding has stopped. His wife does voice concerns he does not get enough vegetables in his diet. No change in stool caliber. No abdominal pain. No family history of colorectal cancer. His mother has undergone colonoscopy examination which apparently was \"negative\". Mr. Justen Quintanilla describes intermittent GERD symptoms which have recently worsened. He awakens at night with severe burning substernal discomfort and sometimes coughing. This also is aggravated by heavier meals, alcohol, greasy foods. He is just finishing a 30 day prescription of pantoprazole from Dr. Andrey Vee which he states has been helping. He is taking it in the mornings. No associated dysphagia with the GERD. He is a smoker. Mr. Justen Quintanilla reports rectal examination with negative stool Hemoccult result with Dr. Andrey Vee. Past Medical History   Reviewed, no changes. Last detailed document: 06/14/2012. Family History (reviewed, updated)   Yes / NoDisease Detail Family Member Name Age   Yes Heart disease Close relative   acid reflux Father   Yes Diabetes Father     Assessment/ Plan   Rectal bleeding (569.3)   GERD (gastroesophageal reflux disease) (48.81)   25-year-old man with medical history of only smoking, no family history of colorectal cancer, with intermittent painless blood streaking with bowel movements and on the toilet paper with wiping himself as above. Reassuring reported rectal examination as above. Secondary concern for nocturnal GERD symptoms as above, improving on recent pantoprazole prescription. 1. I offered and recommended colonoscopy examination today to further evaluate this bleeding.  We discussed the nature and risks of colonoscopy examination and I described sedation options of conscious sedation or MAC anesthesia likely with propofol. At this point, learning about the exam and its relatively rare risks, the patient and his wife wish to defer colonoscopy examination and observe his bleeding. We discussed the importance of calling me back and returning for colonoscopy examination if the bleeding continues, worsens, or if there are any changes in his bowel patterns, stool shape, or development of any pain or discomfort. During the conversation, I emphasized the ease and tolerability of the exam under sedation, either conscious sedation or deep sedation with MAC anesthesia. 2. Continue pantoprazole medication for the GERD. I recommended taking it before dinner as his symptoms are primarily nocturnal and in the morning. We discussed triggers for GERD today including caffeine, alcohol, heavy greasy meals, tomatoes, citrus. He is aware of several of these. No dysphagia with the GERD symptoms as above. I did not recommend EGD examination at this point given his age and duration of the symptoms. Refill prescription transmitted today for one year of pantoprazole. 3. I recommended smoking cessation. The smoking is likely aggravating his GERD symptoms, and poses a risk for esophageal, gastric, colon cancer in the future. Followup with Dr. Rashmi Frias for further counseling on smoking cessation.    ======================  [facility]    KALPESH ABURTOREY  DATE OF PROCEDURE: January 15, 2015    PREOPERATIVE DIAGNOSIS:  Rectal bleeding, internal hemorrhoids. POSTOPERATIVE DIAGNOSIS:  See impression. PROCEDURES:  Colonoscopy. SURGEON:  Jeannine Mccormick M.D. ANESTHESIA:  MAC anesthesia provided by the anesthesia service for this patient with anticipated intolerance of the exam under conscious sedation drugs.   Severe anxiety regarding this exam.    COLONOSCOPY PROCEDURE:  After the nature and risks of a colonoscopy examination under MAC anesthesia were discussed with Mr. Denny Later and his questions answered, his informed consent was obtained. He was interviewed and examined by Dr. Eunice Monroy of Anesthesia and the nurse anesthetist and sedated by them. Digital rectal exam was performed which showed somewhat macerated, inflamed appearing internal hemorrhoids in the anal canal with a close external exam.  No palpable mass in the rectal vault. The Olympus pediatric video colonoscope was placed in the patient's rectum and advanced under direct visualization through the entire length of the colon up to the cecum and into the terminal ileum. Cecum was confirmed by landmarks including appendiceal orifice, cecal strap, and ileocecal valve. The quality of the prep was good. Retroflexion was performed in the rectum. COLONOSCOPY FINDINGS:  There were indeed medium sized, somewhat inflamed and macerated internal hemorrhoids in the distal rectal vault. Suspect bleeding from these. Further in, there was diverticulosis, mild to moderate up the sigmoid and descending colon and in the cecum and ascending colon. The cecum and terminal ileum, ileocecal valve were normal.  Just outside of the cecum, one or two folds out from the ileocecal valve, there was a sessile 10 mm polyp, fairly smooth and subtle. This was raised up by an aggressive submucosal saline injection and then snared off in one piece by snare cautery polypectomy using the hexagonal snare. That was suctioned out. A smaller piece of polypoid tissue at one margin was snared off on a second cold snare. Up in the lip of the cecum, there was a second smaller 4 mm polyp removed by cold snare polypectomy and sent in the same specimen jar. From here, mild diverticulosis up the ascending colon, normal hepatic flexure and transverse colon, mild-to-moderate diverticulosis down the descending and sigmoid.   Forward and retroflexed views in the rectum showed the above hemorrhoids as well as a very fleshy 8-mm polyp in the distal rectum just inside the anal canal.  This was removed by snare cautery polypectomy and suctioned out. The scope was straightened, air was suctioned out, and the scope was withdrawn from the patient. He tolerated the procedure well under the MAC anesthesia. IMPRESSION:  1. Three colon polyps removed as above including a fleshy 8 mm rectal polyp and a fairly subtle sessile polyp in the proximal ascending colon as above. 2.  Mild-to-moderate diverticulosis of the left colon and ascending colon in this 27year old. 3.  Medium sized, somewhat engorged and inflamed internal hemorrhoids, likely cause of recurring rectal bleeding. RECOMMENDATIONS:  1. Followup above colon polyp pathology results. 2.  High fiber diet. 3.  Repeat colonoscopy examination in three to five years as per polyp pathology results. 4.  If bleeding persists, consider the HET hemorrhoid ablation treatment. Electronically Approved by:  Lilia Sepulveda M.D. Path: Largest polyp was a sessile serrated adenoma  ======================    [facility]    PHYSICIAN:  Lilia Sepulveda M.D. Operative Report    DATE OF PROCEDURE: March 26, 2015    PREOPERATIVE DIAGNOSES:    1. Long history of gastroesophageal reflux disease symptoms. 2.  Smoking. POSTOPERATIVE DIAGNOSIS:  Mild reflux esophagitis. PROCEDURES:  Esophagogastroduodenoscopy. SURGEON:  Lilia Sepulveda M.D. ANESTHESIA:  MAC anesthesia provided by the anesthesia service for this patient with significant anxiety regarding the procedure, young age, and anticipated intolerance of EGD examination under MAC anesthesia. EGD PROCEDURE:  After the nature and risks of EGD examination under MAC anesthesia were discussed with Mr. Rakesh Fleming and his questions answered, his informed consent was obtained.   He was interviewed and examined, sedated by Dr. Melvi Velazquez and the nurse anesthetist.  Once sedated, the Olympus adult diagnostic gastroscope was placed in the patient's mouth and advanced under direct visualization through the oropharynx into the esophagus down through the stomach and pylorus to the duodenal bulb and second portion of the duodenum. Retroflexion was performed in the stomach. EGD FINDINGS:  The esophagus showed mild edema, blistering at the Z-line above a sliding 3-4 cm hiatal hernia. Reflux changes were fairly mild. No abnormal mucosa suggestive the Randall's. No stricture. Further in, the proximal and distal gastric mucosa appeared entirely healthy and normal from the cardia and the fundus down the body, incisura, and antrum. The pylorus, duodenal bulb, second and third portions of the duodenum were normal.  The scope was drawn back to the stomach, air and secretions suctioned out, and the scope was withdrawn from the patient. IMPRESSION:  Mild reflux changes above a 3-4 cm sliding hiatal hernia as above. RECOMMENDATIONS:  1.  H2 blocker or PPI medication as needed. 2.  Diet and lifestyle measures for GERD. 3.  Absolute importance to smoking cessation again discussed today. Electronically Approved by:  Evert Muro M.D.    ======================  COLONOSCOPY PROCEDURE REPORT     DATE OF PROCEDURE:  3/6/2018   PCP: Keturah Serrato MD     PREOPERATIVE DIAGNOSIS: Rectal bleeding, personal history adenomatous colon polyp     POSTOPERATIVE DIAGNOSIS:  See impression. SURGEON:  Christopher A. Jyl Bors, M.D. Dennie OkBob Alexei anesthesia provided by the Anesthesia Service. MAC anesthesia requested due to age 44, significant anxiety and aversion to the procedure; anticipated intolerance of colonoscopy examination under conscious sedation. COLONOSCOPY PROCEDURE:   After the nature and risks of colonoscopy examination under MAC anesthesia were discussed with the patient and all questions answered, informed consent was obtained.   The patient was sedated as above. Digital rectal exam was performed which showed no masses. The Olympus pediatric video colonoscope was placed in the patient's rectum and advanced under direct visualization through the entire length of the colon up to the cecum and terminal ileum. Retroflexed exam performed up the ascending colon to the hepatic flexure and then around to the transverse colon. The cecum was confirmed by landmarks including appendiceal orifice, cecal strap, ileocecal valve. Retroflexion was performed in the rectum. The quality of the prep was good. COLONOSCOPY FINDINGS:    Mild diverticulosis ascending and sigmoid colon  Bulbous, medium sized internal hemorrhoids, one area looking like it may be prolapsing into the anal canal.  Photographs taken. Multiple red well signs on at least 3 hemorrhoid plexuses coming down to the dentate line. RECOMMENDATIONS:  High fiber diet. Consider General Surgery consultation if intervention is desired on these hemorrhoids. Repeat colonoscopy examination in 5 years due to history adenomatous colon polyp January 2015  ======================    HPI    Review of Systems       Current Outpatient Medications   Medication Sig Dispense Refill    Fluocinolone Acetonide Scalp 0.01 % External Oil APPLY TO EARS EVERY NIGHT FOR TWO WEEKS. THEN ONLY USE AS NEEDED. SKYRIZI  MG/ML Subcutaneous Solution Auto-injector       atorvastatin 40 MG Oral Tab Take 1 tablet (40 mg total) by mouth nightly. 90 tablet 3    aspirin 81 MG Oral Tab Take 1 tablet (81 mg total) by mouth daily. Allergies:  Sulfa Antibiotics           Comment:Other reaction(s): rashes   PHYSICAL EXAM:   Physical Exam         ASSESSMENT/PLAN:   No diagnosis found. No recent labs. 39year old healthy man with reassuring family history seen initially 2012 with concern for rectal bleeding, later on GERD symptoms.     Colonoscopy and then EGD examination 2015 showed a significant sessile serrated adenoma colon polyp; hiatal hernia and mild reflux esophagitis. Significant internal hemorrhoids in this patient describing rectal bleeding, likely prolapsing hemorrhoids. 3-year follow-up recommended. Follow-up colonoscopy examination March 2018 reassuring, large likely prolapsing internal hemorrhoids only. Returns for follow-up 8/21/2023 with ongoing hemorrhoidal symptoms, discomfort swelling question of mild prolapse. 1.  Personal history sessile serrated adenoma 10 mm polyp colonoscopy examination of 1/15/2015 above  Reassuring 3-year follow-up colonoscopy examination March 2018  No change in symptoms. Currently taking aspirin 81 mg daily, atorvastatin lipid-lowering therapy which likely will reduce risk of future colon polyps, colon cancer  Schedule next follow-up colonoscopy examination due to history of sessile serrated adenoma polyp    2. Internal hemorrhoids with bleeding and discomfort  We discussed surgical resection of hemorrhoids, surgery evaluation prior to colonoscopy examination which I recommended today prior to the colonoscopy. Eliane Wood would like to make a trial of endoscopic hemorrhoid band ligation treatment. We will perform that during the upcoming colonoscopy examination. 3.  GERD symptoms  Currently quiescent, controlled with changes to diet and lifestyle    4. Elevated hepatic transaminases  Recent LFTs 11/7/2020 include AST 33 ALT 55 up slightly from previous  Would continue to monitor; consider hepatitis B and C serologies, liver imaging  Recent LFTs 7/22/2023 included AST 29 ALT 39 alk phosphatase 114    5. Cigarette smoking  Absolute importance of smoking cessation discussed today. Prior to this encounter, I spent over 10 minutes preparing for the visit, including reviewing documents from other specialties as well as from PCP and going over test results.  During and after the face to face encounter, I spent an additional 30 minutes discussing the above and counseling this patient, reviewing chart notes and data with patient and composing this note. No orders of the defined types were placed in this encounter.       Meds This Visit:  Requested Prescriptions      No prescriptions requested or ordered in this encounter       Imaging & Referrals:  None     OR#[id number]

## 2023-08-21 NOTE — TELEPHONE ENCOUNTER
Scheduled for:  Colonoscopy [id number] with Hemorrhoid Banding  Provider Name:  Dr Evonne Bui  Date:  1/23/2024  Location:  [facility]  Sedation:  MAC  Time:  8:45 am. (pt is aware to arrive at 7:45 am)  Prep:  Suprep/Golytely  Meds/Allergies Reconciled?:  Physician Reviewed  Diagnosis with codes:    History sessile serrated adenoma colon polyp K63.5  Was patient informed to call insurance with codes (Y/N):  yes   Referral sent?:  Referral was sent at the time of electronic surgical scheduling. [address] [facility] or [address]  notified?:  I sent an electronic request to Endo Scheduling and received a confirmation today. Medication Orders:  Pt is aware to NOT take iron pills, herbal meds and diet supplements for 7 days before exam. Also to NOT take any form of alcohol, recreational drugs and any forms of ED meds 24 hours before exam.   Misc Orders:  N/A   Further instructions given by staff:  I discussed the prep instructions with the patient which he verbally understood. I provided patient with prep instruction's sheet in office. Patient was informed about the new cancellation policy for his/her procedure. Patient was also given a copy of the cancellation policy at the time of the appointment and verbalized understanding.

## 2023-08-22 PROBLEM — Z86.010 HISTORY OF ADENOMATOUS POLYP OF COLON: Status: ACTIVE | Noted: 2023-08-22

## 2023-12-04 ENCOUNTER — TELEPHONE (OUTPATIENT)
Dept: INTERNAL MEDICINE CLINIC | Facility: CLINIC | Age: 43
End: 2023-12-04

## 2023-12-04 DIAGNOSIS — E78.00 HYPERCHOLESTEROLEMIA: ICD-10-CM

## 2023-12-04 RX ORDER — ATORVASTATIN CALCIUM 40 MG/1
40 TABLET, FILM COATED ORAL NIGHTLY
Qty: 90 TABLET | Refills: 1 | Status: SHIPPED | OUTPATIENT
Start: 2023-12-04

## 2023-12-04 NOTE — TELEPHONE ENCOUNTER
Per wife of patient,, patient still have a refill on his Atorvastatin to the [facility] but that pharmacy have been closed and so needs to transfer to [facility] on file verified. [id number] [place] - [address] AT [facility] [address], [phone], [phone] [[id number]]     Current Outpatient Medications   Medication Sig Dispense Refill                         atorvastatin 40 MG Oral Tab Take 1 tablet (40 mg total) by mouth nightly.  90 tablet 3

## 2023-12-14 ENCOUNTER — TELEPHONE (OUTPATIENT)
Facility: CLINIC | Age: 43
End: 2023-12-14

## 2023-12-14 NOTE — TELEPHONE ENCOUNTER
Pt wife calling for a New [facility]  The Pharmacy that it went to, is now closed.

## 2024-01-03 RX ORDER — SODIUM, POTASSIUM,MAG SULFATES 17.5-3.13G
SOLUTION, RECONSTITUTED, ORAL ORAL
Qty: 1 EACH | Refills: 0 | Status: SHIPPED | OUTPATIENT
Start: 2024-01-03

## 2024-01-03 NOTE — TELEPHONE ENCOUNTER
Wife is following up on this please send medicine to [id number] [place] - [address] AT [facility] [address], [phone], [phone]

## 2024-01-18 ENCOUNTER — TELEPHONE (OUTPATIENT)
Dept: FAMILY MEDICINE CLINIC | Facility: CLINIC | Age: 44
End: 2024-01-18

## 2024-01-18 NOTE — TELEPHONE ENCOUNTER
Patients spouse called on the patients behalf requesting a refill on the following medication. Per spouse he has 7 pills left.    Refill medication:    atorvastatin 40 MG Oral Tab       Pharmacy:    [facility] #[id number] - [place] [address] AT [facility] [address], [phone], [phone]

## 2024-01-18 NOTE — TELEPHONE ENCOUNTER
Chart reviewed.     Has atorvastatin 40mgRX dated 12/4/23 #90 with one refill.     Patient spouse was left a message to call the pharmacy.

## 2024-01-22 ENCOUNTER — TELEPHONE (OUTPATIENT)
Facility: CLINIC | Age: 44
End: 2024-01-22

## 2024-01-22 NOTE — TELEPHONE ENCOUNTER
I spoke to the pt's wife    We reviewed the bowel prep instructions in detail    All of the questions were answered regarding the bowel prep and pt's wife will go over the Suprep instructions with her

## 2024-01-23 ENCOUNTER — ANESTHESIA (OUTPATIENT)
Dept: ENDOSCOPY | Facility: HOSPITAL | Age: 44
End: 2024-01-23
Payer: COMMERCIAL

## 2024-01-23 ENCOUNTER — HOSPITAL ENCOUNTER (OUTPATIENT)
Facility: HOSPITAL | Age: 44
Setting detail: HOSPITAL OUTPATIENT SURGERY
Discharge: HOME | End: 2024-01-23
Attending: INTERNAL MEDICINE | Admitting: INTERNAL MEDICINE
Payer: COMMERCIAL

## 2024-01-23 ENCOUNTER — ANESTHESIA EVENT (OUTPATIENT)
Dept: ENDOSCOPY | Facility: HOSPITAL | Age: 44
End: 2024-01-23
Payer: COMMERCIAL

## 2024-01-23 VITALS
OXYGEN SATURATION: 97 % | HEIGHT: 71 IN | DIASTOLIC BLOOD PRESSURE: 75 MMHG | HEART RATE: 89 BPM | BODY MASS INDEX: 23.8 KG/M2 | RESPIRATION RATE: 16 BRPM | SYSTOLIC BLOOD PRESSURE: 112 MMHG | TEMPERATURE: 98 F | WEIGHT: 170 LBS

## 2024-01-23 DIAGNOSIS — K63.5 SESSILE COLONIC POLYP: ICD-10-CM

## 2024-01-23 PROCEDURE — 06LY8CC OCCLUSION OF HEMORRHOIDAL PLEXUS WITH EXTRALUMINAL DEVICE, VIA NATURAL OR ARTIFICIAL OPENING ENDOSCOPIC: ICD-10-PCS | Performed by: INTERNAL MEDICINE

## 2024-01-23 PROCEDURE — 45398 COLONOSCOPY W/BAND LIGATION: CPT | Performed by: INTERNAL MEDICINE

## 2024-01-23 PROCEDURE — 45385 COLONOSCOPY W/LESION REMOVAL: CPT | Performed by: INTERNAL MEDICINE

## 2024-01-23 PROCEDURE — 0DBK8ZX EXCISION OF ASCENDING COLON, VIA NATURAL OR ARTIFICIAL OPENING ENDOSCOPIC, DIAGNOSTIC: ICD-10-PCS | Performed by: INTERNAL MEDICINE

## 2024-01-23 RX ORDER — SODIUM CHLORIDE, SODIUM LACTATE, POTASSIUM CHLORIDE, CALCIUM CHLORIDE 600; 310; 30; 20 MG/100ML; MG/100ML; MG/100ML; MG/100ML
INJECTION, SOLUTION INTRAVENOUS CONTINUOUS
Status: DISCONTINUED | OUTPATIENT
Start: 2024-01-23 | End: 2024-01-23

## 2024-01-23 RX ORDER — LIDOCAINE HYDROCHLORIDE 10 MG/ML
INJECTION, SOLUTION EPIDURAL; INFILTRATION; INTRACAUDAL; PERINEURAL AS NEEDED
Status: DISCONTINUED | OUTPATIENT
Start: 2024-01-23 | End: 2024-01-23 | Stop reason: SURG

## 2024-01-23 RX ORDER — NALOXONE HYDROCHLORIDE 0.4 MG/ML
0.08 INJECTION, SOLUTION INTRAMUSCULAR; INTRAVENOUS; SUBCUTANEOUS ONCE AS NEEDED
Status: DISCONTINUED | OUTPATIENT
Start: 2024-01-23 | End: 2024-01-23

## 2024-01-23 RX ADMIN — LIDOCAINE HYDROCHLORIDE 50 MG: 10 INJECTION, SOLUTION EPIDURAL; INFILTRATION; INTRACAUDAL; PERINEURAL at 08:46:00

## 2024-01-23 RX ADMIN — SODIUM CHLORIDE, SODIUM LACTATE, POTASSIUM CHLORIDE, CALCIUM CHLORIDE: 600; 310; 30; 20 INJECTION, SOLUTION INTRAVENOUS at 09:36:00

## 2024-01-23 NOTE — OPERATIVE REPORT
COLONOSCOPY AND HEMORRHOIDAL BAND LIGATION PROCEDURE REPORT     DATE OF PROCEDURE:  1/23/2024     PCP: Brandon Vu MD     PREOPERATIVE DIAGNOSIS:  History sessile serrated adenoma colon polyp, hemorrhoidal symptoms including rectal bleeding pain possible hemorrhoid prolapse     POSTOPERATIVE DIAGNOSIS:  See impression.     SURGEON:  Gomez Shin M.D.     SEDATION:      MAC anesthesia provided by the anesthesia service     COLONOSCOPY PROCEDURE:   After the nature and risks of colonoscopy examination possible polypectomy with anticipated band ligation of internal hemorrhoids under MAC anesthesia were discussed with the patient and all questions answered, informed consent was obtained.  The patient was sedated as above.       Digital rectal exam was performed which showed no masses.  The Olympus pediatric colonoscope was placed in the rectum and advanced up to the cecum and terminal ileum.  Cecum was confirmed by landmarks including appendiceal orifice, ileocecal valve, terminal ileum.  Retroflexion was performed in the ascending colon, the colonoscope was gradually withdrawn and exam completed.    We switched out our endoscopes.  An Olympus adult diagnostic gastroscope was placed in the patient's rectum and advanced under direct visualization up to the distal sigmoid  colon.  The scope was then placed in retroflexed position to examine the distal rectum.     After completing examination of the rectum, the gastroscope was withdrawn.  A Cook \"4, 6, 10\" variceal band ligation ragland/ banding apparatus was then mounted onto the gastroscope in the usual fashion, and then the gastroscope with banding ragland was advanced back into the rectum and placed in retroflex view.  See below.     The quality of the prep was excellent.    Estimated blood loss: none/insignificant    COLONOSCOPY FINDINGS:    Sessile 5 mm polyp identified and removed from behind a fold in the mid ascending colon, retroflex position removed by cold  snare polypectomy technique and suctioned out.  Moderate severity colonic diverticulosis ascending descending sigmoid colon.  Large internal hemorrhoids with 1 especially prominent bundle distal rectal vault with chronic inflammatory changes likely prolapsing down into the anal canal.  This bundle of tissue was targeted and grasped up into the banding ragland, 2 bands placed sequentially over the same mass of hemorrhoid tissue.  2 adjacent sites of purple hemorrhoid tissue were then banded, with total of 3 different targets banded today.    RECOMMENDATIONS:  High fiber diet.  Follow-up above colon polyp pathology results.  Repeat colonoscopy examination in 5 years.  If Noah tolerates today's procedure well, could consider repeat sigmoidoscopy and band ligation x 1 or 2 more treatments if bleeding persists.  No aspirin or NSAID medications for next 10 days to prevent bleeding

## 2024-01-23 NOTE — ANESTHESIA POSTPROCEDURE EVALUATION
Patient: Vikash Mock    Procedure Summary       Date: 01/23/24 Room / Location: [facility] ENDOSCOPY 05 / [facility] ENDOSCOPY    Anesthesia Start: 0844 Anesthesia Stop: 0937    Procedure: COLONOSCOPY with polypectomy and banding of hemorrhoids Diagnosis:       Sessile colonic polyp      (colon polyp, hemorrhoids, diverticulosis)    Surgeons: Gomez Shin MD Anesthesiologist: Anni Caraballo CRNA    Anesthesia Type: general ASA Status: 2            Anesthesia Type: general    Vitals Value Taken Time   /82 01/23/24 0935   Temp 98 °F (36.7 °C) 01/23/24 0935   Pulse 82 01/23/24 0937   Resp 11 01/23/24 0937   SpO2 100 % 01/23/24 0937   Vitals shown include unfiled device data.    [facility] AN Post Evaluation:   Patient Evaluated in PACU  Patient Participation: complete - patient participated  Level of Consciousness: awake and awake and alert  Pain Score: 0  Pain Management: adequate  Airway Patency:patent  Dental exam unchanged from preop  Yes    Cardiovascular Status: acceptable  Respiratory Status: acceptable  Postoperative Hydration acceptable      Anni Caraballo CRNA  1/23/2024 9:38 AM

## 2024-01-23 NOTE — ANESTHESIA PREPROCEDURE EVALUATION
Anesthesia PreOp Note    HPI:     Vikash Mock is a 43 year old male who presents for preoperative consultation requested by: Gomez Shin MD    Date of Surgery: 1/23/2024    Procedure(s):  COLONOSCOPY  Indication: Sessile colonic polyp    Relevant Problems   No relevant active problems       NPO:  Last Liquid Consumption Date: 01/23/24  Last Liquid Consumption Time: 0400  Last Solid Consumption Date: 01/21/24  Last Solid Consumption Time: 1900  Last Liquid Consumption Date: 01/23/24          History Review:  Patient Active Problem List    Diagnosis Date Noted    History of adenomatous polyp of colon 08/22/2023    Adenomatous colon polyp 11/10/2020    Cigarette nicotine dependence without complication 04/07/2018    Family history of cardiac arrest 01/20/2018    Hypercholesterolemia 01/20/2018    Dry skin dermatitis 01/20/2018       Past Medical History:   Diagnosis Date    High cholesterol     Hyperlipidemia        Past Surgical History:   Procedure Laterality Date    APPENDECTOMY      APPENDECTOMY  2000    COLONOSCOPY  2014    COLONOSCOPY N/A 3/6/2018    Procedure: COLONOSCOPY;  Surgeon: Gomez Shin MD;  Location: [facility] ENDOSCOPY       Medications Prior to Admission   Medication Sig Dispense Refill Last Dose    hydrocortisone 2.5 % External Ointment APPLY TOPICALLY TO THE AFFECTED AREA DAILY FOR UP TO 2 WEEKS AS NEEDED    at prn    atorvastatin 40 MG Oral Tab Take 1 tablet (40 mg total) by mouth nightly. 90 tablet 1 1/21/2024    SKYRIZI  MG/ML Subcutaneous Solution Auto-injector    12/25/2023    aspirin 81 MG Oral Tab Take 1 tablet (81 mg total) by mouth daily.   1/21/2024    Na Sulfate-K Sulfate-Mg Sulf (SUPREP BOWEL PREP KIT) 17.5-3.13-1.6 GM/177ML Oral Solution Take as directed 1 each 0     Fluocinolone Acetonide Scalp 0.01 % External Oil APPLY TO EARS EVERY NIGHT FOR TWO WEEKS. THEN ONLY USE AS NEEDED.        Current Facility-Administered Medications Ordered in Epic    Medication Dose Route Frequency Provider Last Rate Last Admin    lactated ringers infusion   Intravenous Continuous Gomez Shin MD 20 mL/hr at 01/23/24 0754 New Bag at 01/23/24 0754     No current Epic-ordered outpatient medications on file.       Allergies   Allergen Reactions    Sulfa Antibiotics      Other reaction(s): rashes       Family History   Problem Relation Age of Onset    Diabetes Father     Gastro-Intestinal Disorder Father         acid reflux    Heart Disease Other         close relative    Cancer Maternal Grandfather         Skin cancer of the ears    Heart Disorder Paternal Grandfather      Social History     Socioeconomic History    Marital status:    Tobacco Use    Smoking status: Every Day     Packs/day: 1.00     Years: 10.00     Additional pack years: 0.00     Total pack years: 10.00     Types: Cigarettes, Cigars    Smokeless tobacco: Current    Tobacco comments:     <1 per day and Vape   Vaping Use    Vaping Use: Never used   Substance and Sexual Activity    Alcohol use: Yes     Comment: 2 beers occasionally.  Last drink - last night    Drug use: No   Other Topics Concern    Caffeine Concern Yes     Comment: coffee 2 cups    [place] up [place] a farm No    History of tanning No    Outdoor occupation Yes    Pt has a pacemaker No    Pt has a defibrillator No    Reaction to local anesthetic No       Available pre-op labs reviewed.             Vital Signs:  Body mass index is 23.71 kg/m².   height is 1.803 m (5' 11\") and weight is 77.1 kg (170 lb). His oral temperature is 98 °F (36.7 °C). His blood pressure is 120/79 and his pulse is 101. His respiration is 12 and oxygen saturation is 96%.   Vitals:    01/17/24 0915 01/23/24 0749   BP:  120/79   Pulse:  101   Resp:  12   Temp:  98 °F (36.7 °C)   TempSrc:  Oral   SpO2:  96%   Weight: 77.1 kg (170 lb)    Height: 1.803 m (5' 11\")         Anesthesia Evaluation     Patient summary reviewed and Nursing notes reviewed    Airway   Mallampati:  I  TM distance: >3 FB  Neck ROM: full  Dental - Dentition appears grossly intact     Pulmonary - normal exam     ROS comment: Current smoker 1 pack per day  Cardiovascular - normal exam  Exercise tolerance: good    ROS comment: High cholestrol    Neuro/Psych - negative ROS     GI/Hepatic/Renal    (+) bowel prep    Endo/Other - negative ROS   Abdominal  - normal exam                 Anesthesia Plan:   ASA:  2  Plan:   General  Informed Consent Plan and Risks Discussed With:  Patient  Discussed plan with:  CRNA, attending and surgeon      I have informed Vikash Mock and/or legal guardian or family member of the nature of the anesthetic plan, benefits, risks including possible dental damage if relevant, major complications, and any alternative forms of anesthetic management.   All of the patient's questions were answered to the best of my ability. The patient desires the anesthetic management as planned.  Anni Caraballo CRNA  1/23/2024 8:24 AM  Present on Admission:  **None**

## 2024-01-23 NOTE — H&P
History & Physical Examination    Patient Name: Vikash Mock  MRN: [id number]  CSN: [id number]  YOB: 1980    Diagnosis: History sessile serrated adenoma colon polyp, hemorrhoidal symptoms including rectal bleeding pain possible hemorrhoid prolapse    PRESENT ILLNESS: 43-year-old gentleman with BMI 23.7, history of tobacco smoking, psoriasis, recent concern for rectal bleeding previous history sessile serrated adenoma colon polyp suspected hemorrhoidal type bleeding who presents for      BMI Readings from Last 1 Encounters:   01/17/24 23.71 kg/m²         Medications Prior to Admission   Medication Sig Dispense Refill Last Dose    hydrocortisone 2.5 % External Ointment APPLY TOPICALLY TO THE AFFECTED AREA DAILY FOR UP TO 2 WEEKS AS NEEDED    at prn    atorvastatin 40 MG Oral Tab Take 1 tablet (40 mg total) by mouth nightly. 90 tablet 1 1/21/2024    SKYRIZI  MG/ML Subcutaneous Solution Auto-injector    12/25/2023    aspirin 81 MG Oral Tab Take 1 tablet (81 mg total) by mouth daily.   1/21/2024    Na Sulfate-K Sulfate-Mg Sulf (SUPREP BOWEL PREP KIT) 17.5-3.13-1.6 GM/177ML Oral Solution Take as directed 1 each 0     Fluocinolone Acetonide Scalp 0.01 % External Oil APPLY TO EARS EVERY NIGHT FOR TWO WEEKS. THEN ONLY USE AS NEEDED.        Current Facility-Administered Medications   Medication Dose Route Frequency    lactated ringers infusion   Intravenous Continuous       Allergies:   Allergies   Allergen Reactions    Sulfa Antibiotics      Other reaction(s): rashes       Past Medical History:   Diagnosis Date    High cholesterol     Hyperlipidemia      Past Surgical History:   Procedure Laterality Date    APPENDECTOMY      APPENDECTOMY  2000    COLONOSCOPY  2014    COLONOSCOPY N/A 3/6/2018    Procedure: COLONOSCOPY;  Surgeon: Gomez Shin MD;  Location: [facility] ENDOSCOPY     Family History   Problem Relation Age of Onset    Diabetes Father     Gastro-Intestinal Disorder Father          acid reflux    Heart Disease Other         close relative    Cancer Maternal Grandfather         Skin cancer of the ears    Heart Disorder Paternal Grandfather      Social History     Tobacco Use    Smoking status: Every Day     Packs/day: 1.00     Years: 10.00     Additional pack years: 0.00     Total pack years: 10.00     Types: Cigarettes, Cigars    Smokeless tobacco: Current    Tobacco comments:     <1 per day and Vape   Substance Use Topics    Alcohol use: Yes     Comment: 2 beers occasionally.  Last drink - last night       SYSTEM Check if Review is Normal Check if Physical Exam is Normal If not normal, please explain:   HEENT [ ] [X ]    NECK & BACK [ ] [ ]    HEART [ X ] [ X ]    LUNGS [ X ] [ X ]    ABDOMEN [ X ] [ X ]    UROGENITAL [ ] [ ]    EXTREMITIES [ ] [ ]    OTHER        See Anesthesia documentation    [ x ] I have discussed the risks and benefits and alternatives with the patient/family.  They understand and agree to proceed with plan of care.  [ x ] I have reviewed the History and Physical done within the last 30 days.  Any changes noted above.    Gomez Shin MD  1/23/2024  8:15 AM

## 2024-01-23 NOTE — DISCHARGE INSTRUCTIONS
.  .  .  Notes from Dr Shin:  Medium severity \"diverticulosis\" (pockets) of the wall of the colon. You should be provided a handout regarding diet and the possible risk of diverticulitis/infection  One more very small benign colon polyp was found and removed today - to be sent to the lab to be examined - a letter will be sent with results.  You may see small quantities of blood with your next 1-2 bowel movements today.    If you check your results on "[employer] the \"pathology\" report on the colon polyp(s) should be released within the next 24-48 hours.  In general, a \"hyperplastic\" colon polyp is completely benign, vs an \"adenoma\" or \"sessile serrated adenoma\" which is considered a benign but precancerous colon polyp.  That will be explained in a letter/email from me as well.  The \"banding\" procedure of your hemorrhoids went verywell.  They are pretty big and you will probably have some discomfort today.  Let us know if the pain is severe or if you develop bleeding.  Those bands are now black instead of light blue see will probably not see them follow-up next few days.  No aspirin, Excedrin, Advil/Motrin/ibuprofen, Aleve/naproxen for next 10 days (to prevent bleeding.)  Internal hemorrhoids - very common  If you are raw and irritated back there after all of that diarrhea and wiping, three good options to soothe and heal the irritation include Aquaphor ointment, \"Desitin\" or \"A & D\" diaper ointment, or good old-fashioned Vaseline.  All of these soothe and create a protective barrier so that your skin can heal.  If this procedure was not too uncomfortable, we could repeat the \"banding\" procedure in the next few months if this helps but was not quite enough.  Those are pretty big hemorrhoids.  I recommend repeat colonoscopy exam in 5 years.  .  .  .  Home Care Instructions for Colonoscopy  with Sedation    Diet:  - Resume your regular diet as tolerated unless otherwise instructed.  - Start with light meals to minimize  bloating.  - Do not drink alcohol today.    Medication:  - If you have questions about resuming your normal medications, please contact your Primary Care Physician.    Activities:  - Take it easy today. Do not return to work today.  - Do not drive today.  - Do not operate any machinery today (including kitchen equipment).    Colonoscopy:  - You may notice some rectal \"spotting\" (a little blood on the toilet tissue) for a day or two after the exam. This is normal.  - If you experience any rectal bleeding (not spotting), persistent tenderness or sharp severe abdominal pains, oral temperature over 100 degrees Fahrenheit, light-headedness or dizziness, or any other problems, contact your doctor.  **If unable to reach your doctor, please go to the [facility] Emergency Room**    - Your referring physician will receive a full report of your examination.  - If you do not hear from your doctor's office within two weeks of your biopsy, please call them for your results.    You may be able to see your laboratory results in 2U between 4 and 7 business days.  In some cases, your physician may not have viewed the results before they are released to 2U.  If you have questions regarding your results contact the physician who ordered the test/exam by phone or via 2U by choosing \"Ask a Medical Question.\"

## 2024-02-10 ENCOUNTER — HOSPITAL ENCOUNTER (OUTPATIENT)
Dept: CT IMAGING | Age: 44
Discharge: HOME | End: 2024-02-10
Attending: INTERNAL MEDICINE

## 2024-02-10 VITALS
SYSTOLIC BLOOD PRESSURE: 110 MMHG | HEIGHT: 71 IN | DIASTOLIC BLOOD PRESSURE: 70 MMHG | WEIGHT: 170 LBS | BODY MASS INDEX: 23.8 KG/M2

## 2024-02-10 DIAGNOSIS — Z13.6 SCREENING FOR CARDIOVASCULAR CONDITION: ICD-10-CM

## 2024-02-10 LAB
GLUCOSE POC: 77 MG/DL (ref 70–100)
HDL POC: 59 MG/DL (ref 40–60)
TC/HDL RATIO: 3 (ref 0–5)
TOTAL CHOLESTEROL POC: 188 MG/DL (ref 0–200)

## 2024-02-10 NOTE — PROGRESS NOTES
Date of Service 2/10/2024    DEBBI ABURTO  Date of Birth 7/11/1980    Patient Age: 43 year old    PCP: Dyana Prkaash MD  [address] [address]    Heart Scan Consult  Preliminary Heart Scan Score: 0    Previous Screening  Heart Scan Completed Previously: No        Peripheral Vascular Scan Completed Previously: No          Risk Factors  Personal Risk Factors  Non-alterable Risk Factors: Age;Family History  Alterable Risk Factors: Smoking;Abnormal Cholesterol;Unhealthy eating      Body Mass Index  Body mass index is 23.71 kg/m².    Blood Pressure     /70     (Normal =< 120/80,  Elevated = 120-129/ >80,  High Stage1 130-139/80-89 , Stage2 >140/>90)    Lipid Profile  Patient was in fasting state: Yes     Takes cholesterol medication     Cholesterol: 188, done on 2/10/2024.  HDL Cholesterol: 59, done on 2/10/2024.  LDL Cholesterol: NA, done on 2/10/2024.  TriGlycerides <45, done on 2/10/2024    Cholesterol Goals  Value   Total  =< 200   HDL  = > 45 Men = > 55 Women   LDL   =< 100   Triglycerides  =< 150       Glucose and Hemoglobin A1C  Lab Results   Component Value Date    GLU 77 02/10/2024     (Normal Fasting Glucose < 100mg/dl )    Nurse Review  Risk factor information and results reviewed with Nurse: Yes    Recommended Follow Up:  Consult your physician regarding:: Final Heart Scan Report;Discuss potential for Incidental Finding    Recommendations for Change:  Nutrition Changes: Low Saturated Fat;Low Fat Dairy;Low Salt Eating;Increase Fiber    Cholesterol Modification (goal of therapy depends upon your risk): Decrease LDL (Lousy/Bad) Ideal <100    Exercise: Initiate Program very active all day but not formally exercising     Smoking Cessation: Ready to Quit Today (readiness to quit 8 smoking cessation strategies given)    Weight Management: Maintain Current Weight    Stress Management: Adopt Stress Management Techniques    Repeat Heart Scan: 5 years if Calcium Score is 0.0;Discuss with  your Physician              [place] Recommended Resources:  Recommended Resources: Upcoming Classes, Medical Services and Health Library [email or web address]            Mei HERNANDEZ RN        Please Contact the Nurse Heart Line with any Questions or Concerns [phone].

## 2024-02-20 ENCOUNTER — TELEPHONE (OUTPATIENT)
Facility: CLINIC | Age: 44
End: 2024-02-20

## 2024-02-20 ENCOUNTER — MED REC SCAN ONLY (OUTPATIENT)
Facility: CLINIC | Age: 44
End: 2024-02-20

## 2024-02-20 NOTE — TELEPHONE ENCOUNTER
Gomez Shin MD  [facility] Clinical Staff  GI RNs - 1. Please print and mail this letter to patient; 2. Recall for colonoscopy exam in 5 years    Results letter sent to patient via [facility] and also printed and mailed out.     Patient outreach entered for 5 year colonoscopy recall. Done on 1/23/2024; due on 1/23/2029.     Health maintenance updated.

## 2024-03-02 ENCOUNTER — LAB ENCOUNTER (OUTPATIENT)
Dept: LAB | Age: 44
End: 2024-03-02
Attending: INTERNAL MEDICINE
Payer: COMMERCIAL

## 2024-03-02 DIAGNOSIS — Z82.49 FAMILY HISTORY OF CARDIAC ARREST: ICD-10-CM

## 2024-03-02 DIAGNOSIS — Z00.00 PHYSICAL EXAM, ROUTINE: ICD-10-CM

## 2024-03-02 DIAGNOSIS — Z12.5 SCREENING FOR PROSTATE CANCER: ICD-10-CM

## 2024-03-02 DIAGNOSIS — E78.00 HYPERCHOLESTEROLEMIA: Primary | ICD-10-CM

## 2024-03-02 LAB
ALBUMIN SERPL-MCNC: 4.5 G/DL (ref 3.2–4.8)
ALBUMIN/GLOB SERPL: 1.6 {RATIO} (ref 1–2)
ALP LIVER SERPL-CCNC: 119 U/L
ALT SERPL-CCNC: 68 U/L
ANION GAP SERPL CALC-SCNC: 3 MMOL/L (ref 0–18)
AST SERPL-CCNC: 40 U/L (ref ?–34)
BASOPHILS # BLD AUTO: 0.11 X10(3) UL (ref 0–0.2)
BASOPHILS NFR BLD AUTO: 1.2 %
BILIRUB SERPL-MCNC: 0.3 MG/DL (ref 0.3–1.2)
BILIRUB UR QL: NEGATIVE
BUN BLD-MCNC: 11 MG/DL (ref 9–23)
BUN/CREAT SERPL: 11.3 (ref 10–20)
CALCIUM BLD-MCNC: 10 MG/DL (ref 8.7–10.4)
CHLORIDE SERPL-SCNC: 107 MMOL/L (ref 98–112)
CHOLEST SERPL-MCNC: 216 MG/DL (ref ?–200)
CLARITY UR: CLEAR
CO2 SERPL-SCNC: 28 MMOL/L (ref 21–32)
CREAT BLD-MCNC: 0.97 MG/DL
CRP SERPL-MCNC: <0.4 MG/DL (ref ?–1)
DEPRECATED RDW RBC AUTO: 43.4 FL (ref 35.1–46.3)
EGFRCR SERPLBLD CKD-EPI 2021: 99 ML/MIN/1.73M2 (ref 60–?)
EOSINOPHIL # BLD AUTO: 0.48 X10(3) UL (ref 0–0.7)
EOSINOPHIL NFR BLD AUTO: 5.4 %
ERYTHROCYTE [DISTWIDTH] IN BLOOD BY AUTOMATED COUNT: 12.8 % (ref 11–15)
FASTING PATIENT LIPID ANSWER: YES
FASTING STATUS PATIENT QL REPORTED: YES
GLOBULIN PLAS-MCNC: 2.8 G/DL (ref 2.8–4.4)
GLUCOSE BLD-MCNC: 93 MG/DL (ref 70–99)
GLUCOSE UR-MCNC: NORMAL MG/DL
HCT VFR BLD AUTO: 50.2 %
HDLC SERPL-MCNC: 60 MG/DL (ref 40–59)
HGB BLD-MCNC: 16.1 G/DL
HGB UR QL STRIP.AUTO: NEGATIVE
IMM GRANULOCYTES # BLD AUTO: 0.02 X10(3) UL (ref 0–1)
IMM GRANULOCYTES NFR BLD: 0.2 %
KETONES UR-MCNC: NEGATIVE MG/DL
LDLC SERPL CALC-MCNC: 140 MG/DL (ref ?–100)
LEUKOCYTE ESTERASE UR QL STRIP.AUTO: NEGATIVE
LYMPHOCYTES # BLD AUTO: 2.82 X10(3) UL (ref 1–4)
LYMPHOCYTES NFR BLD AUTO: 31.6 %
MCH RBC QN AUTO: 29.3 PG (ref 26–34)
MCHC RBC AUTO-ENTMCNC: 32.1 G/DL (ref 31–37)
MCV RBC AUTO: 91.3 FL
MONOCYTES # BLD AUTO: 0.65 X10(3) UL (ref 0.1–1)
MONOCYTES NFR BLD AUTO: 7.3 %
NEUTROPHILS # BLD AUTO: 4.85 X10 (3) UL (ref 1.5–7.7)
NEUTROPHILS # BLD AUTO: 4.85 X10(3) UL (ref 1.5–7.7)
NEUTROPHILS NFR BLD AUTO: 54.3 %
NITRITE UR QL STRIP.AUTO: NEGATIVE
NONHDLC SERPL-MCNC: 156 MG/DL (ref ?–130)
OSMOLALITY SERPL CALC.SUM OF ELEC: 285 MOSM/KG (ref 275–295)
PH UR: 6.5 [PH] (ref 5–8)
PLATELET # BLD AUTO: 294 10(3)UL (ref 150–450)
POTASSIUM SERPL-SCNC: 4.6 MMOL/L (ref 3.5–5.1)
PROT SERPL-MCNC: 7.3 G/DL (ref 5.7–8.2)
PROT UR-MCNC: NEGATIVE MG/DL
PSA SERPL-MCNC: 1.63 NG/ML (ref ?–4)
RBC # BLD AUTO: 5.5 X10(6)UL
SODIUM SERPL-SCNC: 138 MMOL/L (ref 136–145)
SP GR UR STRIP: 1.01 (ref 1–1.03)
TRIGL SERPL-MCNC: 90 MG/DL (ref 30–149)
TSI SER-ACNC: 2.72 MIU/ML (ref 0.55–4.78)
UROBILINOGEN UR STRIP-ACNC: NORMAL
VIT B12 SERPL-MCNC: 710 PG/ML (ref 211–911)
VLDLC SERPL CALC-MCNC: 17 MG/DL (ref 0–30)
WBC # BLD AUTO: 8.9 X10(3) UL (ref 4–11)

## 2024-03-02 PROCEDURE — 85025 COMPLETE CBC W/AUTO DIFF WBC: CPT

## 2024-03-02 PROCEDURE — 84443 ASSAY THYROID STIM HORMONE: CPT

## 2024-03-02 PROCEDURE — 86140 C-REACTIVE PROTEIN: CPT

## 2024-03-02 PROCEDURE — 81015 MICROSCOPIC EXAM OF URINE: CPT

## 2024-03-02 PROCEDURE — 80053 COMPREHEN METABOLIC PANEL: CPT

## 2024-03-02 PROCEDURE — 80061 LIPID PANEL: CPT

## 2024-03-02 PROCEDURE — 84153 ASSAY OF PSA TOTAL: CPT

## 2024-03-02 PROCEDURE — 81001 URINALYSIS AUTO W/SCOPE: CPT

## 2024-03-02 PROCEDURE — 82607 VITAMIN B-12: CPT

## 2024-03-02 PROCEDURE — 83695 ASSAY OF LIPOPROTEIN(A): CPT

## 2024-03-02 PROCEDURE — 36415 COLL VENOUS BLD VENIPUNCTURE: CPT

## 2024-03-05 LAB — LIPOPROTEIN (A): 206.4 NMOL/L

## 2024-03-14 NOTE — OPERATIVE REPORT
COLONOSCOPY PROCEDURE REPORT     DATE OF PROCEDURE:  3/6/2018     PCP: Ally Mar MD     PREOPERATIVE DIAGNOSIS: Rectal bleeding, personal history adenomatous colon polyp     POSTOPERATIVE DIAGNOSIS:  See impression. SURGEON:  Gomez Stein Has The Growth Been Previously Biopsied?: has not been previously biopsied

## (undated) DEVICE — SIX SHOOTER SAEED MULTI-BAND LIGATOR: Brand: SAEED

## (undated) DEVICE — Device: Brand: DUAL NARE NASAL CANNULAE FEMALE LUER CON 7FT O2 TUBE

## (undated) DEVICE — ENDOSCOPY PACK - LOWER: Brand: MEDLINE INDUSTRIES, INC.

## (undated) DEVICE — MEDI-VAC NON-CONDUCTIVE SUCTION TUBING 6MM X 1.8M (6FT.) L: Brand: CARDINAL HEALTH

## (undated) DEVICE — KIT ENDO ORCAPOD 160/180/190

## (undated) DEVICE — SNARE OPTMZ PLPCTM TRP

## (undated) DEVICE — 60 ML SYRINGE REGULAR TIP: Brand: MONOJECT

## (undated) DEVICE — KIT CLEAN ENDOKIT 1.1OZ GOWNX2

## (undated) DEVICE — LASSO POLYPECTOMY SNARE: Brand: LASSO

## (undated) DEVICE — Device: Brand: DEFENDO AIR/WATER/SUCTION AND BIOPSY VALVE

## (undated) DEVICE — STERILE LATEX POWDER-FREE SURGICAL GLOVESWITH NITRILE COATING: Brand: PROTEXIS

## (undated) NOTE — LETTER
[facility]  [address], [place], [place]  Authorization for Surgical Operation and Procedure                                                                                           I hereby authorize Gomez Shin MD, my physician and his/her assistants (if applicable), which may include medical students, residents, and/or fellows, to perform the following surgical operation/ procedure and administer such anesthesia as may be determined necessary by my physician: Operation/Procedure name (s) COLONOSCOPY on Vikash Mock   2.   I recognize that during the surgical operation/procedure, unforeseen conditions may necessitate additional or different procedures than those listed above.  I, therefore, further authorize and request that the above-named surgeon, assistants, or designees perform such procedures as are, in their judgment, necessary and desirable.    3.   My surgeon/physician has discussed prior to my surgery the potential benefits, risks and side effects of this procedure; the likelihood of achieving goals; and potential problems that might occur during recuperation.  They also discussed reasonable alternatives to the procedure, including risks, benefits, and side effects related to the alternatives and risks related to not receiving this procedure.  I have had all my questions answered and I acknowledge that no guarantee has been made as to the result that may be obtained.    4.   Should the need arise during my operation/procedure, which includes change of level of care prior to discharge, I also consent to the administration of blood and/or blood products.  Further, I understand that despite careful testing and screening of blood or blood products by collecting agencies, I may still be subject to ill effects as a result of receiving a blood transfusion and/or blood products.  The following are some, but not all, of the potential risks that can occur: fever and  allergic reactions, hemolytic reactions, transmission of diseases such as Hepatitis, AIDS and Cytomegalovirus (CMV) and fluid overload.  In the event that I wish to have an autologous transfusion of my own blood, or a directed donor transfusion, I will discuss this with my physician.  Check only if Refusing Blood or Blood Products  I understand refusal of blood or blood products as deemed necessary by my physician may have serious consequences to my condition to include possible death. I hereby assume responsibility for my refusal and release the hospital, its personnel, and my physicians from any responsibility for the consequences of my refusal.    o  Refuse   5.   I authorize the use of any specimen, organs, tissues, body parts or foreign objects that may be removed from my body during the operation/procedure for diagnosis, research or teaching purposes and their subsequent disposal by hospital authorities.  I also authorize the release of specimen test results and/or written reports to my treating physician on the hospital medical staff or other referring or consulting physicians involved in my care, at the discretion of the Pathologist or my treating physician.    6.   I consent to the photographing or videotaping of the operations or procedures to be performed, including appropriate portions of my body for medical, scientific, or educational purposes, provided my identity is not revealed by the pictures or by descriptive texts accompanying them.  If the procedure has been photographed/videotaped, the surgeon will obtain the original picture, image, videotape or CD.  The hospital will not be responsible for storage, release or maintenance of the picture, image, tape or CD.    7.   I consent to the presence of a  or observers in the operating room as deemed necessary by my physician or their designees.    8.   I recognize that in the event my procedure results in extended X-Ray/fluoroscopy  time, I may develop a skin reaction.    9. If I have a Do Not Attempt Resuscitation (DNAR) order in place, that status will be suspended while in the operating room, procedural suite, and during the recovery period unless otherwise explicitly stated by me (or a person authorized to consent on my behalf). The surgeon or my attending physician will determine when the applicable recovery period ends for purposes of reinstating the DNAR order.  10. Patients having a sterilization procedure: I understand that if the procedure is successful the results will be permanent and it will therefore be impossible for me to inseminate, conceive, or bear children.  I also understand that the procedure is intended to result in sterility, although the result has not been guaranteed.   11. I acknowledge that my physician has explained sedation/analgesia administration to me including the risk and benefits I consent to the administration of sedation/analgesia as may be necessary or desirable in the judgment of my physician.    I CERTIFY THAT I HAVE READ AND FULLY UNDERSTAND THE ABOVE CONSENT TO OPERATION and/or OTHER PROCEDURE.     _________________________________________ _________________________________     ___________________________________  Signature of Patient     Signature of Responsible Person                   Printed Name of Responsible Person                              _________________________________________ ______________________________        ___________________________________  Signature of Witness         Date  Time         Relationship to Patient    STATEMENT OF PHYSICIAN My signature below affirms that prior to the time of the procedure; I have explained to the patient and/or his/her legal representative, the risks and benefits involved in the proposed treatment and any reasonable alternative to the proposed treatment. I have also explained the risks and benefits involved in refusal of the proposed treatment and  alternatives to the proposed treatment and have answered the patient's questions. If I have a significant financial interest in a co-management agreement or a significant financial interest in any product or implant, or other significant relationship used in this procedure/surgery, I have disclosed this and had a discussion with my patient.     _______________________________________________________________ _____________________________  (Signature of Physician)                                                                                         (Date)                                   (Time)  Patient Name: Vikash Mock    : 1980   Printed: 2024      Medical Record #: [id number]                                              Page 1 of 1

## (undated) NOTE — LETTER
[facility]  Administration of Anesthesia  I, Vikash Mock agree to be cared for by a physician anesthesiologist alone and/or with a nurse anesthetist, who is specially trained to monitor me and give me medicine to put me to sleep or keep me comfortable during my procedure    I understand that my anesthesiologist and/or anesthetist is not an employee or agent of [facility] or [employer]. He or she works for [facility]    As the patient asking for anesthesia services, I agree to:  Allow the anesthesiologist (anesthesia doctor) to give me medicine and do additional procedures as necessary. Some examples are: Starting or using an “IV” to give me medicine, fluids or blood during my procedure, and having a breathing tube placed to help me breathe when I’m asleep (intubation). In the event that my heart stops working properly, I understand that my anesthesiologist will make every effort to sustain my life, unless otherwise directed by [facility] Do Not Resuscitate documents.  Tell my anesthesia doctor before my procedure:  If I am pregnant.  The last time that I ate or drank.  iii. All of the medicines I take (including prescriptions, herbal supplements, and pills I can buy without a prescription (including street drugs/illegal medications). Failure to inform my anesthesiologist about these medicines may increase my risk of anesthetic complications.  iv.If I am allergic to anything or have had a reaction to anesthesia before.  I understand how the anesthesia medicine will help me (benefits).  I understand that with any type of anesthesia medicine there are risks:  The most common risks are: nausea, vomiting, sore throat, muscle soreness, damage to my eyes, mouth, or teeth (from breathing tube placement).  Rare risks include: remembering what happened during my procedure, allergic reactions to medications, injury to my airway, heart, lungs, vision, nerves, or  muscles and in extremely rare instances death.  My doctor has explained to me other choices available to me for my care (alternatives).  Pregnant Patients (“epidural”):  I understand that the risks of having an epidural (medicine given into my back to help control pain during labor), include itching, low blood pressure, difficulty urinating, headache or slowing of the baby’s heart. Very rare risks include infection, bleeding, seizure, irregular heart rhythms and nerve injury.  Regional Anesthesia (“spinal”, “epidural”, & “nerve blocks”):  I understand that rare but potential complications include headache, bleeding, infection, seizure, irregular heart rhythms, and nerve injury.    _____________________________________________________________________________  Patient (or Representative) Signature/Relationship to Patient  Date   Time    _____________________________________________________________________________   Name (if used)    Language/Organization   Time    _____________________________________________________________________________  Nurse Anesthetist Signature     Date   Time  _____________________________________________________________________________  Anesthesiologist Signature     Date   Time  I have discussed the procedure and information above with the patient (or patient’s representative) and answered their questions. The patient or their representative has agreed to have anesthesia services.    _____________________________________________________________________________  Witness        Date   Time  I have verified that the signature is that of the patient or patient’s representative, and that it was signed before the procedure  Patient Name: Vikash Mock     : 1980                 Printed: 2024 at 7:28 AM    Medical Record #: [id number]                                            Page 1 of 1  ----------ANESTHESIA CONSENT----------

## (undated) NOTE — LETTER
[facility]  [address], [place], [place]  Authorization for Surgical Operation and Procedure                                                                                           I hereby authorize Gomez Shin MD, my physician and his/her assistants (if applicable), which may include medical students, residents, and/or fellows, to perform the following surgical operation/ procedure and administer such anesthesia as may be determined necessary by my physician: Operation/Procedure name (s) COLONOSCOPY/POSSIBLE HEMORRHOID BANDING on Vikash Mock   2.   I recognize that during the surgical operation/procedure, unforeseen conditions may necessitate additional or different procedures than those listed above.  I, therefore, further authorize and request that the above-named surgeon, assistants, or designees perform such procedures as are, in their judgment, necessary and desirable.    3.   My surgeon/physician has discussed prior to my surgery the potential benefits, risks and side effects of this procedure; the likelihood of achieving goals; and potential problems that might occur during recuperation.  They also discussed reasonable alternatives to the procedure, including risks, benefits, and side effects related to the alternatives and risks related to not receiving this procedure.  I have had all my questions answered and I acknowledge that no guarantee has been made as to the result that may be obtained.    4.   Should the need arise during my operation/procedure, which includes change of level of care prior to discharge, I also consent to the administration of blood and/or blood products.  Further, I understand that despite careful testing and screening of blood or blood products by collecting agencies, I may still be subject to ill effects as a result of receiving a blood transfusion and/or blood products.  The following are some, but not all, of the potential risks that  can occur: fever and allergic reactions, hemolytic reactions, transmission of diseases such as Hepatitis, AIDS and Cytomegalovirus (CMV) and fluid overload.  In the event that I wish to have an autologous transfusion of my own blood, or a directed donor transfusion, I will discuss this with my physician.  Check only if Refusing Blood or Blood Products  I understand refusal of blood or blood products as deemed necessary by my physician may have serious consequences to my condition to include possible death. I hereby assume responsibility for my refusal and release the hospital, its personnel, and my physicians from any responsibility for the consequences of my refusal.    o  Refuse   5.   I authorize the use of any specimen, organs, tissues, body parts or foreign objects that may be removed from my body during the operation/procedure for diagnosis, research or teaching purposes and their subsequent disposal by hospital authorities.  I also authorize the release of specimen test results and/or written reports to my treating physician on the hospital medical staff or other referring or consulting physicians involved in my care, at the discretion of the Pathologist or my treating physician.    6.   I consent to the photographing or videotaping of the operations or procedures to be performed, including appropriate portions of my body for medical, scientific, or educational purposes, provided my identity is not revealed by the pictures or by descriptive texts accompanying them.  If the procedure has been photographed/videotaped, the surgeon will obtain the original picture, image, videotape or CD.  The hospital will not be responsible for storage, release or maintenance of the picture, image, tape or CD.    7.   I consent to the presence of a  or observers in the operating room as deemed necessary by my physician or their designees.    8.   I recognize that in the event my procedure results in extended  X-Ray/fluoroscopy time, I may develop a skin reaction.    9. If I have a Do Not Attempt Resuscitation (DNAR) order in place, that status will be suspended while in the operating room, procedural suite, and during the recovery period unless otherwise explicitly stated by me (or a person authorized to consent on my behalf). The surgeon or my attending physician will determine when the applicable recovery period ends for purposes of reinstating the DNAR order.  10. Patients having a sterilization procedure: I understand that if the procedure is successful the results will be permanent and it will therefore be impossible for me to inseminate, conceive, or bear children.  I also understand that the procedure is intended to result in sterility, although the result has not been guaranteed.   11. I acknowledge that my physician has explained sedation/analgesia administration to me including the risk and benefits I consent to the administration of sedation/analgesia as may be necessary or desirable in the judgment of my physician.    I CERTIFY THAT I HAVE READ AND FULLY UNDERSTAND THE ABOVE CONSENT TO OPERATION and/or OTHER PROCEDURE.     _________________________________________ _________________________________     ___________________________________  Signature of Patient     Signature of Responsible Person                   Printed Name of Responsible Person                              _________________________________________ ______________________________        ___________________________________  Signature of Witness         Date  Time         Relationship to Patient    STATEMENT OF PHYSICIAN My signature below affirms that prior to the time of the procedure; I have explained to the patient and/or his/her legal representative, the risks and benefits involved in the proposed treatment and any reasonable alternative to the proposed treatment. I have also explained the risks and benefits involved in refusal of the  proposed treatment and alternatives to the proposed treatment and have answered the patient's questions. If I have a significant financial interest in a co-management agreement or a significant financial interest in any product or implant, or other significant relationship used in this procedure/surgery, I have disclosed this and had a discussion with my patient.     _______________________________________________________________ _____________________________  (Signature of Physician)                                                                                         (Date)                                   (Time)  Patient Name: Vikash Mock    : 1980   Printed: 2024      Medical Record #: [id number]                                              Page 1 of 1